# Patient Record
Sex: FEMALE | Race: BLACK OR AFRICAN AMERICAN | NOT HISPANIC OR LATINO | Employment: OTHER | URBAN - METROPOLITAN AREA
[De-identification: names, ages, dates, MRNs, and addresses within clinical notes are randomized per-mention and may not be internally consistent; named-entity substitution may affect disease eponyms.]

---

## 2023-04-17 LAB
6-ACETYLMORPHINE: <25 NG/ML
7-AMINOCLONAZEPAM: <25 NG/ML
ALPHA-HYDROXYALPRAZOLAM: <25 NG/ML
ALPHA-HYDROXYMIDAZOLAM: <25 NG/ML
ALPRAZOLAM: <25 NG/ML
AMPHETAMINE (PRESENCE) IN URINE BY SCREEN METHOD: ABNORMAL
BARBITURATES PRESENCE IN URINE BY SCREEN METHOD: ABNORMAL
CANNABINOIDS IN URINE BY SCREEN METHOD: ABNORMAL
CHLORDIAZEPOXIDE: <25 NG/ML
CLONAZEPAM: <25 NG/ML
COCAINE (PRESENCE) IN URINE BY SCREEN METHOD: ABNORMAL
CODEINE: <50 NG/ML
CREATINE, URINE FOR DRUG: 57.5 MG/DL
DIAZEPAM: <25 NG/ML
DRUG SCREEN COMMENT URINE: ABNORMAL
EDDP: <25 NG/ML
FENTANYL CONFIRMATION, URINE: <2.5 NG/ML
HYDROCODONE: <25 NG/ML
HYDROMORPHONE: <25 NG/ML
LORAZEPAM: <25 NG/ML
METHADONE CONFIRMATION,URINE: <25 NG/ML
MIDAZOLAM: <25 NG/ML
MORPHINE URINE: <50 NG/ML
NORDIAZEPAM: <25 NG/ML
NORFENTANYL: <2.5 NG/ML
NORHYDROCODONE: <25 NG/ML
NOROXYCODONE: >1000 NG/ML
O-DESMETHYLTRAMADOL: <50 NG/ML
OXAZEPAM: <25 NG/ML
OXYCODONE: 1029 NG/ML
OXYMORPHONE: 265 NG/ML
PHENCYCLIDINE (PRESENCE) IN URINE BY SCREEN METHOD: ABNORMAL
TEMAZEPAM: <25 NG/ML
TRAMADOL: <50 NG/ML
ZOLPIDEM METABOLITE (ZCA): <25 NG/ML
ZOLPIDEM: <25 NG/ML

## 2023-10-23 ENCOUNTER — LAB (OUTPATIENT)
Dept: LAB | Facility: LAB | Age: 73
End: 2023-10-23
Payer: COMMERCIAL

## 2023-10-23 DIAGNOSIS — M54.16 RADICULOPATHY, LUMBAR REGION: Primary | ICD-10-CM

## 2023-10-23 DIAGNOSIS — Z79.891 LONG TERM (CURRENT) USE OF OPIATE ANALGESIC: ICD-10-CM

## 2023-10-23 DIAGNOSIS — M54.16 RADICULOPATHY, LUMBAR REGION: ICD-10-CM

## 2023-10-23 PROCEDURE — 80373 DRUG SCREENING TRAMADOL: CPT

## 2023-10-23 PROCEDURE — 82570 ASSAY OF URINE CREATININE: CPT

## 2023-10-23 PROCEDURE — 80365 DRUG SCREENING OXYCODONE: CPT

## 2023-10-23 PROCEDURE — 80358 DRUG SCREENING METHADONE: CPT

## 2023-10-23 PROCEDURE — 80368 SEDATIVE HYPNOTICS: CPT

## 2023-10-23 PROCEDURE — 80354 DRUG SCREENING FENTANYL: CPT

## 2023-10-23 PROCEDURE — 80361 OPIATES 1 OR MORE: CPT

## 2023-10-23 PROCEDURE — 80346 BENZODIAZEPINES1-12: CPT

## 2023-10-23 PROCEDURE — 80307 DRUG TEST PRSMV CHEM ANLYZR: CPT

## 2023-10-24 LAB
AMPHETAMINES UR QL SCN: NORMAL
BARBITURATES UR QL SCN: NORMAL
BZE UR QL SCN: NORMAL
CANNABINOIDS UR QL SCN: NORMAL
CREAT UR-MCNC: 65.6 MG/DL (ref 20–320)
PCP UR QL SCN: NORMAL

## 2023-10-26 LAB
1OH-MIDAZOLAM UR CFM-MCNC: <25 NG/ML
6MAM UR CFM-MCNC: <25 NG/ML
7AMINOCLONAZEPAM UR CFM-MCNC: <25 NG/ML
A-OH ALPRAZ UR CFM-MCNC: <25 NG/ML
ALPRAZ UR CFM-MCNC: <25 NG/ML
CHLORDIAZEP UR CFM-MCNC: <25 NG/ML
CLONAZEPAM UR CFM-MCNC: <25 NG/ML
CODEINE UR CFM-MCNC: <50 NG/ML
DIAZEPAM UR CFM-MCNC: <25 NG/ML
EDDP UR CFM-MCNC: <25 NG/ML
FENTANYL UR CFM-MCNC: <2.5 NG/ML
HYDROCODONE CTO UR CFM-MCNC: <25 NG/ML
HYDROMORPHONE UR CFM-MCNC: <25 NG/ML
LORAZEPAM UR CFM-MCNC: <25 NG/ML
METHADONE UR CFM-MCNC: <25 NG/ML
MIDAZOLAM UR CFM-MCNC: <25 NG/ML
MORPHINE UR CFM-MCNC: <50 NG/ML
NORDIAZEPAM UR CFM-MCNC: <25 NG/ML
NORFENTANYL UR CFM-MCNC: <2.5 NG/ML
NORHYDROCODONE UR CFM-MCNC: <25 NG/ML
NOROXYCODONE UR CFM-MCNC: >1000 NG/ML
NORTRAMADOL UR-MCNC: <50 NG/ML
OXAZEPAM UR CFM-MCNC: <25 NG/ML
OXYCODONE UR CFM-MCNC: 646 NG/ML
OXYMORPHONE UR CFM-MCNC: 202 NG/ML
TEMAZEPAM UR CFM-MCNC: <25 NG/ML
TRAMADOL UR CFM-MCNC: <50 NG/ML
ZOLPIDEM UR CFM-MCNC: <25 NG/ML
ZOLPIDEM UR-MCNC: <25 NG/ML

## 2023-11-20 ENCOUNTER — OFFICE VISIT (OUTPATIENT)
Dept: PAIN MEDICINE | Facility: CLINIC | Age: 73
End: 2023-11-20
Payer: COMMERCIAL

## 2023-11-20 DIAGNOSIS — M51.36 LUMBAR DEGENERATIVE DISC DISEASE: Primary | ICD-10-CM

## 2023-11-20 DIAGNOSIS — Z79.891 LONG TERM CURRENT USE OF OPIATE ANALGESIC: ICD-10-CM

## 2023-11-20 DIAGNOSIS — M06.09 RHEUMATOID ARTHRITIS OF MULTIPLE SITES WITH NEGATIVE RHEUMATOID FACTOR (MULTI): ICD-10-CM

## 2023-11-20 DIAGNOSIS — M54.16 LUMBAR RADICULITIS: ICD-10-CM

## 2023-11-20 PROBLEM — M51.369 LUMBAR DEGENERATIVE DISC DISEASE: Status: ACTIVE | Noted: 2023-11-20

## 2023-11-20 PROCEDURE — 99214 OFFICE O/P EST MOD 30 MIN: CPT | Performed by: PHYSICAL MEDICINE & REHABILITATION

## 2023-11-20 RX ORDER — NALOXONE HYDROCHLORIDE 4 MG/.1ML
4 SPRAY NASAL AS NEEDED
Qty: 2 EACH | Refills: 0 | Status: SHIPPED | OUTPATIENT
Start: 2023-11-20

## 2023-11-20 RX ORDER — DICLOFENAC SODIUM 10 MG/G
4 GEL TOPICAL 4 TIMES DAILY
Qty: 120 G | Refills: 2 | Status: SHIPPED | OUTPATIENT
Start: 2023-11-20 | End: 2024-01-22 | Stop reason: SDUPTHER

## 2023-11-20 RX ORDER — METHOCARBAMOL 500 MG/1
500 TABLET, FILM COATED ORAL 3 TIMES DAILY
Qty: 90 TABLET | Refills: 1 | Status: SHIPPED | OUTPATIENT
Start: 2023-11-20 | End: 2024-01-22 | Stop reason: SDUPTHER

## 2023-11-20 RX ORDER — GABAPENTIN 300 MG/1
300 CAPSULE ORAL 3 TIMES DAILY
Qty: 90 CAPSULE | Refills: 1 | Status: SHIPPED | OUTPATIENT
Start: 2023-11-20 | End: 2024-01-22 | Stop reason: SDUPTHER

## 2023-11-20 RX ORDER — OXYCODONE AND ACETAMINOPHEN 5; 325 MG/1; MG/1
1 TABLET ORAL EVERY 6 HOURS PRN
Qty: 112 TABLET | Refills: 0 | Status: SHIPPED | OUTPATIENT
Start: 2023-12-05 | End: 2024-01-02

## 2023-11-20 RX ORDER — OXYCODONE AND ACETAMINOPHEN 5; 325 MG/1; MG/1
1 TABLET ORAL EVERY 6 HOURS PRN
Qty: 112 TABLET | Refills: 0 | Status: SHIPPED | OUTPATIENT
Start: 2024-01-02 | End: 2024-01-22 | Stop reason: SDUPTHER

## 2023-11-20 NOTE — PROGRESS NOTES
Chief complaint  Back and lower limbs pain  Multiple joints pain    History  Ms Lara is back for visit  She continues to have the pain in the joints she see rheumatology  The pain in the back is deep achy worse in the mid back area.  This is associated with tight muscle bands.  This limits the range of motion of the lumbar spine mainly in forward flexion.  The pain in the back is radiating around the side on the lateral aspect of the   thighs going down toward the lateral aspect of the legs into the lateral malleosli toward the lateral aspect of the feet towards the big toes.    This pain down to the lower limbs is more of a burning tingling sensation.  The pain in the   lower limbs worsens with bending forward or with any lifting, it improves with laying on the side and resting.  This is similar to the associated pain in the middle to lower back.  Denied any bowel or bladder incontinence.  With the worst pain there is tendency to catch the toe especially on carpeted area.  This occurs mostly when tired and toward the end of the day.       Pain level without medication is 7/10 , with the medication pain level 0/10.     The pain meds are helping control the pain and improving Activities of Daily living and quality of life and quality of sleep.    opioids treatment agreement 2023  Oarrs pulled and scanned in the chart  no concerns  last urine toxicology testing earlier this year and it was compliant we will repeat  Xray updated spine   ORT Score is  0  Pain pathology and pain generators spine and joints   Modalities tried injection, surgery, physical therapy, TENS unit, nonsteroidal anti-inflammatory medication       Denied any fever or chills. No weight loss and no night sweats. No cough or sputum production. No diarrhea   The constipation has been responding to fibers and over the counter medications.     No bladder and bowel incontinence and no other changes in bladder and bowel. No skin changes.  Reports  tiredness and fatigability only if the pain is not controlled.   Denied opioids diversion and abuse and denies alcoholism. Denies overuse of the pain medications.    The control of the pain with the pain medications is helping the control of the symptoms and allowing the function and activities of daily living, enjoyment of life, improving the quality of life and sleep with less interruption by the pain. The goal is symptomatic control of the nonmalignant chronic pain and not to repair the permanent damage in the tissues inducing the chronic pain conditions. We are aiming to shift the focus from the nonmalignant chronic pain to other aspects of life by symptomatically treating this chronic pain. If this pain is not treated it will lead to major morbidity and it is also associated with increased risks of mortality. The patient understands those very clearly and also understand high risks of morbidity and mortality if not strictly adherent to the treatment recommendations and reporting any associated side effects. Also patient understand the full responsibility associated with these medications to avoid abuse or overuse or any use of these medications for anything besides treating the patient's own chronic pain and nothing else under any circumstances.        Physical examination  Awake, alert and oriented for time place and persons   declined Chaperone for the visit and was adequately  draped for the exam.      There is decreased sensory to light touch on the lateral aspects of the thighs and around the   knee going down to the lateral aspect of the legs to the   lateral malleoli into the dorsum of the feet..    Deep tendon reflexes is present for the patellar tendons bilaterally.  Achilles reflexes are present bilaterally and symmetric.    Medial Hamstrings reflex is decreased bilaterally  Plantar cutaneous are downgoing.  Ankle dorsiflexion is 5/5 bilaterally.  Plantar flexion of the ankles are 5/5 bilaterally.   Big toe extension is 4/5 bilaterally  Negative Tinel's sign over the right peroneal nerve at the fibular neck.  Gracie sign for axial loading and global rotation are negative.  No aberrant pain behavior.   She has swelling at the joints     Diagnosis  Problem List Items Addressed This Visit       Lumbar degenerative disc disease - Primary    Lumbar radiculitis    Rheumatoid arthritis of multiple sites with negative rheumatoid factor (CMS/HCC)    Long term current use of opiate analgesic    Relevant Orders    Opiate/Opioid/Benzo Prescription Compliance        Plan  Reviewed the pain generators.  Went over the types of pain with neuropathic and nociceptive and different pathologies and therapeutic modalities. Discussed the mechanism of action of interventions from acupuncture, physical therapy , regular exercises, injections, botox, spinal cord stimulation, and role of surgery     Went over pathology of the intervertebral disc displacement and the anatomical relation to the Nerve roots and relation to the radicular symptoms. Went over treatment modalities with conservative treatment including acupuncture   and epidural steroid injection with fluoroscopy guidance and last resort of surgery    Based on the above findings and the clinical response to the opioids medications and improvement of the activities of daily living, sleep, and work performance. We made this complex decision to continue the opioids therapy in light of the evidence of the patient's responsibility in using the pain medications as prescribed for the nonmalignant chronic pain condition. We discussed about the use of the pain medications to treat the symptoms of chronic nonmalignant pain and we are not trying the repair the permanent damage in the tissues, rather we are trying to control the symptoms induced by the permanent damage to the tissues inducing the chronic pain condition and resulting disability. I explained the difference and discussed it  with the patient and stressed the importance of knowing the difference especially because of the potential side effects and the potential addicting effect and habit forming nature of the dangerous drugs we are using to treat the symptoms of the chronic pain.      We discussed that we are prescribing the medications on good qi and legitimate medical reason.     We reviewed the side effects and precautions of opioids prescriptions as discussed in the opioids treatment agreement.    realizes the interaction between the therapeutic classes including the respiratory depression and potential death     Random drug testing twice in 6 months we will submit     Oxycodone max of 4 a day  Robaxin and gabapentin . Movantik for constipation    Discussed about NSAIDS and I explained about the opioids sparing effect to allow keeping the opioids dose at minimal effective dose.   I went over the potential side effects of the NSAIDS on the gastrointestinal, renal and cardiovascular systems.      I detailed the side effects from the acetaminophen in the medication and made aware of those. I also explained about the cumulative effects on the organs and mainly the liver.     Given the opioids therapy , we discussed about the risk for accidental over dose on the pain medications, either for patient or other household. I went over the mechanism of action and mode of use of the Naloxone according to the  recommendations. I will provide a prescription for a kit.     Follow-up 8 weeks or earlier if needed     The level of clinical decision making in this office visit,  is high, given the high risks of complications with the morbidity and mortality due to the fact that acute and chronic pain may pose a threat to life and bodily function, if under treated, poorly treated, or with failure to maintain adequate treatment and timely medical follow up. Additionally over treatment has its own set of complications including overdosing  on the pain medications and also the habit forming potentials with the use of the medications used to treat chronic painful conditions including therapeutic classes classified as dangerous medications. Given the serious and fluctuating nature of pain level and instensity with extensive consideration for whenever pain changes, there is always the risk of prolonged functional impairment requiring close patient monitoring with regular assessments and reassessments and high level medical decision making at every office visit. The amount and complexity of data reviewed is high given the patient clinical presentation, labs,  data, radiology reports, and other tests as discussed during office visits. Pertinent data whether positive or negative were taken in consideration in the process of making this high level medical decision.

## 2024-01-22 ENCOUNTER — OFFICE VISIT (OUTPATIENT)
Dept: PAIN MEDICINE | Facility: CLINIC | Age: 74
End: 2024-01-22
Payer: COMMERCIAL

## 2024-01-22 DIAGNOSIS — M51.36 LUMBAR DEGENERATIVE DISC DISEASE: ICD-10-CM

## 2024-01-22 DIAGNOSIS — Z79.891 LONG TERM CURRENT USE OF OPIATE ANALGESIC: ICD-10-CM

## 2024-01-22 DIAGNOSIS — M06.09 RHEUMATOID ARTHRITIS OF MULTIPLE SITES WITH NEGATIVE RHEUMATOID FACTOR (MULTI): Primary | ICD-10-CM

## 2024-01-22 DIAGNOSIS — M54.16 LUMBAR RADICULITIS: ICD-10-CM

## 2024-01-22 PROCEDURE — 99214 OFFICE O/P EST MOD 30 MIN: CPT | Performed by: PHYSICAL MEDICINE & REHABILITATION

## 2024-01-22 RX ORDER — METHOCARBAMOL 500 MG/1
500 TABLET, FILM COATED ORAL 3 TIMES DAILY
Qty: 90 TABLET | Refills: 1 | Status: SHIPPED | OUTPATIENT
Start: 2024-01-22 | End: 2024-03-25 | Stop reason: SDUPTHER

## 2024-01-22 RX ORDER — DICLOFENAC SODIUM 10 MG/G
4 GEL TOPICAL 4 TIMES DAILY
Qty: 120 G | Refills: 2 | Status: SHIPPED | OUTPATIENT
Start: 2024-01-22 | End: 2024-03-25 | Stop reason: SDUPTHER

## 2024-01-22 RX ORDER — GABAPENTIN 300 MG/1
300 CAPSULE ORAL 3 TIMES DAILY
Qty: 90 CAPSULE | Refills: 1 | Status: SHIPPED | OUTPATIENT
Start: 2024-02-01 | End: 2024-03-25 | Stop reason: SDUPTHER

## 2024-01-22 RX ORDER — OXYCODONE AND ACETAMINOPHEN 5; 325 MG/1; MG/1
1 TABLET ORAL EVERY 6 HOURS PRN
Qty: 112 TABLET | Refills: 0 | Status: SHIPPED | OUTPATIENT
Start: 2024-02-01 | End: 2024-02-29

## 2024-01-22 RX ORDER — OXYCODONE AND ACETAMINOPHEN 5; 325 MG/1; MG/1
1 TABLET ORAL EVERY 6 HOURS PRN
Qty: 112 TABLET | Refills: 0 | Status: SHIPPED | OUTPATIENT
Start: 2024-02-29 | End: 2024-03-25 | Stop reason: SDUPTHER

## 2024-01-22 NOTE — PROGRESS NOTES
Chief complaint  Back and lower limbs   Joints pain    History  Ms Lara is back for visit  She has back and lower limbs compatible with the degenerative disc disease and lumbar radic  She did not want injeciton at this time  In the past she had those elsewhere and did not last    On the other hand she has the RA and she sees rheum . She is on Embrel sthat is working for her and after that she could cut pain meds from 180 MED to 30 MED currently  Continue with oxycodnoe 5 mg QID  She is aware of controversy of the pain meds for non cancer pain       Pain level without medication is 8/10 , with the medication pain level 4/10.     The pain meds are helping control the pain and improving Activities of Daily living and quality of life and quality of sleep.    opioids treatment agreement Jan 2024  PDI (Pain Disability Index) score: 49  Oarrs pulled and scanned in the chart  no concerns  last urine toxicology testing earlier this year and it was compliant we will repeat  Xray updated spine   ORT Score is  0  Pain pathology and pain generators spine and RA  Modalities tried injection, surgery, physical therapy, TENS unit, nonsteroidal anti-inflammatory medication       Denied any fever or chills. No weight loss and no night sweats. No cough or sputum production. No diarrhea   The constipation has been responding to fibers and over the counter medications.     No bladder and bowel incontinence and no other changes in bladder and bowel. No skin changes.  Reports tiredness and fatigability only if the pain is not controlled.   Denied opioids diversion and abuse and denies alcoholism. Denies overuse of the pain medications.    The control of the pain with the pain medications is helping the control of the symptoms and allowing the function and activities of daily living, enjoyment of life, improving the quality of life and sleep with less interruption by the pain. The goal is symptomatic control of the nonmalignant chronic  pain and not to repair the permanent damage in the tissues inducing the chronic pain conditions. We are aiming to shift the focus from the nonmalignant chronic pain to other aspects of life by symptomatically treating this chronic pain. If this pain is not treated it will lead to major morbidity and it is also associated with increased risks of mortality. The patient understands those very clearly and also understand high risks of morbidity and mortality if not strictly adherent to the treatment recommendations and reporting any associated side effects. Also patient understand the full responsibility associated with these medications to avoid abuse or overuse or any use of these medications for anything besides treating the patient's own chronic pain and nothing else under any circumstances.        Physical examination  Awake, alert and oriented for time place and persons   declined Chaperone for the visit and was adequately  draped for the exam.      There is decreased sensory to light touch on the lateral aspects of the thighs and around the   knee going down to the lateral aspect of the legs to the   lateral malleoli into the dorsum of the feet..    Deep tendon reflexes is present for the patellar tendons bilaterally.  Achilles reflexes are present bilaterally and symmetric.    Medial Hamstrings reflex is decreased bilaterally  Plantar cutaneous are downgoing.  Ankle dorsiflexion is 5/5 bilaterally.  Plantar flexion of the ankles are 5/5 bilaterally.  Big toe extension is 4/5 bilaterally  Negative Tinel's sign over the right peroneal nerve at the fibular neck.  Gracie sign for axial loading and global rotation are negative.  No aberrant pain behavior.     Swollen joint in knee some pain     Diagnosis  Problem List Items Addressed This Visit       Lumbar degenerative disc disease    Relevant Medications    gabapentin (Neurontin) 300 mg capsule (Start on 2/1/2024)    naloxegol oxalate (Movantik) 25 mg     oxyCODONE-acetaminophen (Percocet) 5-325 mg tablet (Start on 2/1/2024)    oxyCODONE-acetaminophen (Percocet) 5-325 mg tablet (Start on 2/29/2024)    methocarbamol (Robaxin) 500 mg tablet    diclofenac sodium (Voltaren) 1 % gel gel    Lumbar radiculitis    Relevant Medications    gabapentin (Neurontin) 300 mg capsule (Start on 2/1/2024)    naloxegol oxalate (Movantik) 25 mg    oxyCODONE-acetaminophen (Percocet) 5-325 mg tablet (Start on 2/1/2024)    oxyCODONE-acetaminophen (Percocet) 5-325 mg tablet (Start on 2/29/2024)    methocarbamol (Robaxin) 500 mg tablet    diclofenac sodium (Voltaren) 1 % gel gel    Rheumatoid arthritis of multiple sites with negative rheumatoid factor (CMS/HCC) - Primary    Relevant Medications    gabapentin (Neurontin) 300 mg capsule (Start on 2/1/2024)    naloxegol oxalate (Movantik) 25 mg    oxyCODONE-acetaminophen (Percocet) 5-325 mg tablet (Start on 2/1/2024)    oxyCODONE-acetaminophen (Percocet) 5-325 mg tablet (Start on 2/29/2024)    methocarbamol (Robaxin) 500 mg tablet    diclofenac sodium (Voltaren) 1 % gel gel    Long term current use of opiate analgesic    Relevant Medications    gabapentin (Neurontin) 300 mg capsule (Start on 2/1/2024)    naloxegol oxalate (Movantik) 25 mg    methocarbamol (Robaxin) 500 mg tablet    diclofenac sodium (Voltaren) 1 % gel gel        Plan  Reviewed the pain generators.  Went over the types of pain with neuropathic and nociceptive and different pathologies and therapeutic modalities. Discussed the mechanism of action of interventions from acupuncture, physical therapy , regular exercises, injections, botox, spinal cord stimulation, and role of surgery     Went over pathology of the intervertebral disc displacement and the anatomical relation to the Nerve roots and relation to the radicular symptoms. Went over treatment modalities with conservative treatment including acupuncture   and epidural steroid injection with fluoroscopy guidance and last  resort of surgery    Based on the above findings and the clinical response to the opioids medications and improvement of the activities of daily living, sleep, and work performance. We made this complex decision to continue the opioids therapy in light of the evidence of the patient's responsibility in using the pain medications as prescribed for the nonmalignant chronic pain condition. We discussed about the use of the pain medications to treat the symptoms of chronic nonmalignant pain and we are not trying the repair the permanent damage in the tissues, rather we are trying to control the symptoms induced by the permanent damage to the tissues inducing the chronic pain condition and resulting disability. I explained the difference and discussed it with the patient and stressed the importance of knowing the difference especially because of the potential side effects and the potential addicting effect and habit forming nature of the dangerous drugs we are using to treat the symptoms of the chronic pain.      We discussed that we are prescribing the medications on good qi and legitimate medical reason.     We reviewed the side effects and precautions of opioids prescriptions as discussed in the opioids treatment agreement.    realizes the interaction between the therapeutic classes including the respiratory depression and potential death     Random drug testing twice in 6 months we will submit     Continue with oxycodone has a narcan at home know how and when to use it if needed.   Continue with realizes the interaction between the therapeutic classes including the respiratory depression and potential death , and Movantic for consitation   Perlita for burning pain   Continue with Home exercises program and ADL    Discussed about NSAIDS and I explained about the opioids sparing effect to allow keeping the opioids dose at minimal effective dose.   I went over the potential side effects of the NSAIDS on the  gastrointestinal, renal and cardiovascular systems.      I detailed the side effects from the acetaminophen in the medication and made aware of those. I also explained about the cumulative effects on the organs and mainly the liver.     Given the opioids therapy , we discussed about the risk for accidental over dose on the pain medications, either for patient or other household. I went over the mechanism of action and mode of use of the Naloxone according to the  recommendations. I will provide a prescription for a kit.     Follow-up 8 weeks or earlier if needed     The level of clinical decision making in this office visit,  is high, given the high risks of complications with the morbidity and mortality due to the fact that acute and chronic pain may pose a threat to life and bodily function, if under treated, poorly treated, or with failure to maintain adequate treatment and timely medical follow up. Additionally over treatment has its own set of complications including overdosing on the pain medications and also the habit forming potentials with the use of the medications used to treat chronic painful conditions including therapeutic classes classified as dangerous medications. Given the serious and fluctuating nature of pain level and instensity with extensive consideration for whenever pain changes, there is always the risk of prolonged functional impairment requiring close patient monitoring with regular assessments and reassessments and high level medical decision making at every office visit. The amount and complexity of data reviewed is high given the patient clinical presentation, labs,  data, radiology reports, and other tests as discussed during office visits. Pertinent data whether positive or negative were taken in consideration in the process of making this high level medical decision.

## 2024-03-25 ENCOUNTER — OFFICE VISIT (OUTPATIENT)
Dept: PAIN MEDICINE | Facility: CLINIC | Age: 74
End: 2024-03-25
Payer: COMMERCIAL

## 2024-03-25 ENCOUNTER — LAB (OUTPATIENT)
Dept: LAB | Facility: LAB | Age: 74
End: 2024-03-25
Payer: COMMERCIAL

## 2024-03-25 DIAGNOSIS — M51.36 LUMBAR DEGENERATIVE DISC DISEASE: ICD-10-CM

## 2024-03-25 DIAGNOSIS — M06.09 RHEUMATOID ARTHRITIS OF MULTIPLE SITES WITH NEGATIVE RHEUMATOID FACTOR (MULTI): ICD-10-CM

## 2024-03-25 DIAGNOSIS — Z79.891 LONG TERM CURRENT USE OF OPIATE ANALGESIC: ICD-10-CM

## 2024-03-25 DIAGNOSIS — M54.16 LUMBAR RADICULITIS: ICD-10-CM

## 2024-03-25 DIAGNOSIS — Z79.891 LONG TERM CURRENT USE OF OPIATE ANALGESIC: Primary | ICD-10-CM

## 2024-03-25 PROCEDURE — 82570 ASSAY OF URINE CREATININE: CPT

## 2024-03-25 PROCEDURE — 80368 SEDATIVE HYPNOTICS: CPT

## 2024-03-25 PROCEDURE — 80346 BENZODIAZEPINES1-12: CPT

## 2024-03-25 PROCEDURE — 80358 DRUG SCREENING METHADONE: CPT

## 2024-03-25 PROCEDURE — 80307 DRUG TEST PRSMV CHEM ANLYZR: CPT

## 2024-03-25 PROCEDURE — 80365 DRUG SCREENING OXYCODONE: CPT

## 2024-03-25 PROCEDURE — 99214 OFFICE O/P EST MOD 30 MIN: CPT | Performed by: PHYSICAL MEDICINE & REHABILITATION

## 2024-03-25 PROCEDURE — 80373 DRUG SCREENING TRAMADOL: CPT

## 2024-03-25 PROCEDURE — 80361 OPIATES 1 OR MORE: CPT

## 2024-03-25 PROCEDURE — 80354 DRUG SCREENING FENTANYL: CPT

## 2024-03-25 RX ORDER — GABAPENTIN 300 MG/1
300 CAPSULE ORAL 3 TIMES DAILY
Qty: 90 CAPSULE | Refills: 1 | Status: SHIPPED | OUTPATIENT
Start: 2024-03-25 | End: 2024-05-21 | Stop reason: SDUPTHER

## 2024-03-25 RX ORDER — METHOCARBAMOL 500 MG/1
500 TABLET, FILM COATED ORAL 3 TIMES DAILY
Qty: 90 TABLET | Refills: 1 | Status: SHIPPED | OUTPATIENT
Start: 2024-03-25 | End: 2024-04-24

## 2024-03-25 RX ORDER — OXYCODONE AND ACETAMINOPHEN 5; 325 MG/1; MG/1
1 TABLET ORAL EVERY 6 HOURS PRN
Qty: 112 TABLET | Refills: 0 | Status: SHIPPED | OUTPATIENT
Start: 2024-04-28 | End: 2024-05-21 | Stop reason: SDUPTHER

## 2024-03-25 RX ORDER — DICLOFENAC SODIUM 10 MG/G
4 GEL TOPICAL 4 TIMES DAILY
Qty: 120 G | Refills: 2 | Status: SHIPPED | OUTPATIENT
Start: 2024-03-25 | End: 2024-05-21 | Stop reason: SDUPTHER

## 2024-03-25 RX ORDER — OXYCODONE AND ACETAMINOPHEN 5; 325 MG/1; MG/1
1 TABLET ORAL EVERY 6 HOURS PRN
Qty: 112 TABLET | Refills: 0 | Status: SHIPPED | OUTPATIENT
Start: 2024-03-31 | End: 2024-04-28

## 2024-03-25 NOTE — PROGRESS NOTES
Chief complaint  Back and lower limbs pain    History  Phyllis Lara is back for pain management office visit  Continue with pain in the back and R leg  That is sciatica she did not want LUAN neither SCS she had information about those  The pain in the back is deep achy worse in the mid back area.  This is associated with tight muscle bands.  This limits the range of motion of the lumbar spine mainly in forward flexion.  The pain in the back is radiating around the side on the lateral aspect of the right thigh going down toward the lateral aspect of the right leg into the lateral malleolus toward the lateral aspect of the foot towards the big toe.    This pain down to the right lower limb is more of a burning tingling sensation.  The pain in the right lower limb worsens with bending forward or with any lifting, it improves with laying on the side and resting.  This is similar to the associated pain in the middle to lower back.  Denied any bowel or bladder incontinence.  With the worst pain there is tendency to catch the toe especially on carpeted area.  This occurs mostly when tired and toward the end of the day.    The skin is intact with no breakdown.  No vesicles.    Also having pain in joints related to RA and she see  rheum for that     Pain level without medication is 8/10 , with the medication pain level 3/10.     The pain meds are helping control the pain and improving Activities of Daily living and quality of life and quality of sleep.    opioids treatment agreement Jan 2024  PDI (Pain Disability Index) score: 44  Oarrs pulled and scanned in the chart  no concerns  last urine toxicology testing earlier this year and it was compliant we will repeat  Xray updated spine and joints   ORT Score is  0  Pain pathology and pain generators spine   Modalities tried injection, surgery, physical therapy, TENS unit, nonsteroidal anti-inflammatory medication       Denied any fever or chills. No weight loss and no night  sweats. No cough or sputum production. No diarrhea   The constipation has been responding to fibers and over the counter medications.     No bladder and bowel incontinence and no other changes in bladder and bowel. No skin changes.  Reports tiredness and fatigability only if the pain is not controlled.   Denied opioids diversion and abuse and denies alcoholism. Denies overuse of the pain medications.    The control of the pain with the pain medications is helping the control of the symptoms and allowing the function and activities of daily living, enjoyment of life, improving the quality of life and sleep with less interruption by the pain. The goal is symptomatic control of the nonmalignant chronic pain and not to repair the permanent damage in the tissues inducing the chronic pain conditions. We are aiming to shift the focus from the nonmalignant chronic pain to other aspects of life by symptomatically treating this chronic pain. If this pain is not treated it will lead to major morbidity and it is also associated with increased risks of mortality. The patient understands those very clearly and also understand high risks of morbidity and mortality if not strictly adherent to the treatment recommendations and reporting any associated side effects. Also patient understand the full responsibility associated with these medications to avoid abuse or overuse or any use of these medications for anything besides treating the patient's own chronic pain and nothing else under any circumstances.        Physical examination  Awake, alert and oriented for time place and persons   declined Chaperone for the visit and was adequately  draped for the exam.      Examination of the lumbar spine showed tight muscle bands in the mid and lower back area, this is more pronounced on the right compared to the left.  Additionally, this is inducing mild reversal of the lumbar lordosis with functional scoliosis with right-sided concavity.   This scoliosis corrected with  bending of the lumbar spine.     Straight leg raising increased the pain in the back and down the lateral aspect of the right knee onto the lateral leg to the lateral malleolus and foot.     There is decreased sensory to light touch on the lateral aspect of the thigh and around the right knee going down to the lateral aspect of the leg to the right lateral malleolus into the dorsum of the foot..    Deep tendon reflexes is present for the patellar tendons bilaterally.  Achilles reflexes are present bilaterally and symmetric.    Medial Hamstrings reflex is decreased on the right compared to the left side.  Plantar cutaneous are downgoing.  Ankle dorsiflexion is 5/5 bilaterally.  Plantar flexion of the ankles are 5/5 bilaterally.  Big toe extension is 4/5 on the right compared to 5/5 on the left side.    Negative Tinel's sign over the right peroneal nerve at the fibular neck.  Gracie sign for axial loading and global rotation are negative.  No aberrant pain behavior.           Diagnosis  Problem List Items Addressed This Visit       Lumbar degenerative disc disease    Relevant Medications    oxyCODONE-acetaminophen (Percocet) 5-325 mg tablet (Start on 3/31/2024)    oxyCODONE-acetaminophen (Percocet) 5-325 mg tablet (Start on 4/28/2024)    gabapentin (Neurontin) 300 mg capsule    methocarbamol (Robaxin) 500 mg tablet    diclofenac sodium (Voltaren) 1 % gel    naloxegol oxalate (Movantik) 25 mg    Lumbar radiculitis    Relevant Medications    oxyCODONE-acetaminophen (Percocet) 5-325 mg tablet (Start on 3/31/2024)    oxyCODONE-acetaminophen (Percocet) 5-325 mg tablet (Start on 4/28/2024)    gabapentin (Neurontin) 300 mg capsule    methocarbamol (Robaxin) 500 mg tablet    diclofenac sodium (Voltaren) 1 % gel    naloxegol oxalate (Movantik) 25 mg    Rheumatoid arthritis of multiple sites with negative rheumatoid factor (CMS/HCC)    Relevant Medications    oxyCODONE-acetaminophen (Percocet)  5-325 mg tablet (Start on 3/31/2024)    oxyCODONE-acetaminophen (Percocet) 5-325 mg tablet (Start on 4/28/2024)    gabapentin (Neurontin) 300 mg capsule    methocarbamol (Robaxin) 500 mg tablet    diclofenac sodium (Voltaren) 1 % gel    naloxegol oxalate (Movantik) 25 mg    Long term current use of opiate analgesic - Primary    Relevant Medications    gabapentin (Neurontin) 300 mg capsule    methocarbamol (Robaxin) 500 mg tablet    diclofenac sodium (Voltaren) 1 % gel    naloxegol oxalate (Movantik) 25 mg    Other Relevant Orders    Opiate/Opioid/Benzo Prescription Compliance        Plan  Reviewed the pain generators.  Went over the types of pain with neuropathic and nociceptive and different pathologies and therapeutic modalities. Discussed the mechanism of action of interventions from acupuncture, physical therapy , regular exercises, injections, botox, spinal cord stimulation, and role of surgery     Went over pathology of the intervertebral disc displacement and the anatomical relation to the Nerve roots and relation to the radicular symptoms. Went over treatment modalities with conservative treatment including acupuncture   and epidural steroid injection with fluoroscopy guidance and last resort of surgery    Based on the above findings and the clinical response to the opioids medications and improvement of the activities of daily living, sleep, and work performance. We made this complex decision to continue the opioids therapy in light of the evidence of the patient's responsibility in using the pain medications as prescribed for the nonmalignant chronic pain condition. We discussed about the use of the pain medications to treat the symptoms of chronic nonmalignant pain and we are not trying the repair the permanent damage in the tissues, rather we are trying to control the symptoms induced by the permanent damage to the tissues inducing the chronic pain condition and resulting disability. I explained the  difference and discussed it with the patient and stressed the importance of knowing the difference especially because of the potential side effects and the potential addicting effect and habit forming nature of the dangerous drugs we are using to treat the symptoms of the chronic pain.      We discussed that we are prescribing the medications on good qi and legitimate medical reason.     We reviewed the side effects and precautions of opioids prescriptions as discussed in the opioids treatment agreement.    realizes the interaction between the therapeutic classes including the respiratory depression and potential death     Random drug testing twice in 6 months we will submit     Consider cut back oxycodone and consider SCS   has a narcan at home know how and when to use it if needed.   Oxycodone 5 qid   Gabapentin for burning pain   Robaxin for muscle relaxers   Diclofenac gel 1% topically  Continue with Home exercises program   Movantik for OIC.     Discussed about NSAIDS and I explained about the opioids sparing effect to allow keeping the opioids dose at minimal effective dose.   I went over the potential side effects of the NSAIDS on the gastrointestinal, renal and cardiovascular systems.      I detailed the side effects from the acetaminophen in the medication and made aware of those. I also explained about the cumulative effects on the organs and mainly the liver.     Given the opioids therapy , we discussed about the risk for accidental over dose on the pain medications, either for patient or other household. I went over the mechanism of action and mode of use of the Naloxone according to the  recommendations. I will provide a prescription for a kit.     Follow-up 8 weeks or earlier if needed     The level of clinical decision making in this office visit,  is high, given the high risks of complications with the morbidity and mortality due to the fact that acute and chronic pain may pose a  threat to life and bodily function, if under treated, poorly treated, or with failure to maintain adequate treatment and timely medical follow up. Additionally over treatment has its own set of complications including overdosing on the pain medications and also the habit forming potentials with the use of the medications used to treat chronic painful conditions including therapeutic classes classified as dangerous medications. Given the serious and fluctuating nature of pain level and instensity with extensive consideration for whenever pain changes, there is always the risk of prolonged functional impairment requiring close patient monitoring with regular assessments and reassessments and high level medical decision making at every office visit. The amount and complexity of data reviewed is high given the patient clinical presentation, labs,  data, radiology reports, and other tests as discussed during office visits. Pertinent data whether positive or negative were taken in consideration in the process of making this high level medical decision.

## 2024-03-26 LAB
AMPHETAMINES UR QL SCN: NORMAL
BARBITURATES UR QL SCN: NORMAL
BZE UR QL SCN: NORMAL
CANNABINOIDS UR QL SCN: NORMAL
CREAT UR-MCNC: 65.5 MG/DL (ref 20–320)
PCP UR QL SCN: NORMAL

## 2024-03-29 LAB
1OH-MIDAZOLAM UR CFM-MCNC: <25 NG/ML
6MAM UR CFM-MCNC: <25 NG/ML
7AMINOCLONAZEPAM UR CFM-MCNC: <25 NG/ML
A-OH ALPRAZ UR CFM-MCNC: <25 NG/ML
ALPRAZ UR CFM-MCNC: <25 NG/ML
CHLORDIAZEP UR CFM-MCNC: <25 NG/ML
CLONAZEPAM UR CFM-MCNC: <25 NG/ML
CODEINE UR CFM-MCNC: <50 NG/ML
DIAZEPAM UR CFM-MCNC: <25 NG/ML
EDDP UR CFM-MCNC: <25 NG/ML
FENTANYL UR CFM-MCNC: <2.5 NG/ML
HYDROCODONE CTO UR CFM-MCNC: <25 NG/ML
HYDROMORPHONE UR CFM-MCNC: <25 NG/ML
LORAZEPAM UR CFM-MCNC: <25 NG/ML
METHADONE UR CFM-MCNC: <25 NG/ML
MIDAZOLAM UR CFM-MCNC: <25 NG/ML
MORPHINE UR CFM-MCNC: <50 NG/ML
NORDIAZEPAM UR CFM-MCNC: <25 NG/ML
NORFENTANYL UR CFM-MCNC: <2.5 NG/ML
NORHYDROCODONE UR CFM-MCNC: <25 NG/ML
NOROXYCODONE UR CFM-MCNC: >1000 NG/ML
NORTRAMADOL UR-MCNC: <50 NG/ML
OXAZEPAM UR CFM-MCNC: <25 NG/ML
OXYCODONE UR CFM-MCNC: 792 NG/ML
OXYMORPHONE UR CFM-MCNC: 476 NG/ML
TEMAZEPAM UR CFM-MCNC: <25 NG/ML
TRAMADOL UR CFM-MCNC: <50 NG/ML
ZOLPIDEM UR CFM-MCNC: <25 NG/ML
ZOLPIDEM UR-MCNC: <25 NG/ML

## 2024-05-21 ENCOUNTER — OFFICE VISIT (OUTPATIENT)
Dept: PAIN MEDICINE | Facility: CLINIC | Age: 74
End: 2024-05-21
Payer: COMMERCIAL

## 2024-05-21 DIAGNOSIS — Z79.891 LONG TERM CURRENT USE OF OPIATE ANALGESIC: ICD-10-CM

## 2024-05-21 DIAGNOSIS — M51.36 LUMBAR DEGENERATIVE DISC DISEASE: Primary | ICD-10-CM

## 2024-05-21 DIAGNOSIS — M54.16 LUMBAR RADICULITIS: ICD-10-CM

## 2024-05-21 DIAGNOSIS — M06.09 RHEUMATOID ARTHRITIS OF MULTIPLE SITES WITH NEGATIVE RHEUMATOID FACTOR (MULTI): ICD-10-CM

## 2024-05-21 PROCEDURE — 99214 OFFICE O/P EST MOD 30 MIN: CPT | Performed by: PHYSICAL MEDICINE & REHABILITATION

## 2024-05-21 RX ORDER — GABAPENTIN 300 MG/1
300 CAPSULE ORAL 3 TIMES DAILY
Qty: 90 CAPSULE | Refills: 1 | Status: SHIPPED | OUTPATIENT
Start: 2024-05-21 | End: 2024-06-20

## 2024-05-21 RX ORDER — DICLOFENAC SODIUM 10 MG/G
4 GEL TOPICAL 4 TIMES DAILY
Qty: 120 G | Refills: 2 | Status: SHIPPED | OUTPATIENT
Start: 2024-05-21

## 2024-05-21 RX ORDER — OXYCODONE AND ACETAMINOPHEN 5; 325 MG/1; MG/1
1 TABLET ORAL EVERY 6 HOURS PRN
Qty: 112 TABLET | Refills: 0 | Status: SHIPPED | OUTPATIENT
Start: 2024-05-27 | End: 2024-06-24

## 2024-05-21 RX ORDER — OXYCODONE AND ACETAMINOPHEN 5; 325 MG/1; MG/1
1 TABLET ORAL EVERY 6 HOURS PRN
Qty: 112 TABLET | Refills: 0 | Status: SHIPPED | OUTPATIENT
Start: 2024-06-24 | End: 2024-07-22

## 2024-05-21 NOTE — PROGRESS NOTES
Chief complaint  Back and legs pain   Joints pian from RA    History  Phyllis Lara is back for pain management office visit  Continue with pain in the back and joints   Worst is back pain. She is seeing Rheum too  The pain in the back is deep achy worse in the mid back area.  This is associated with tight muscle bands.  This limits the range of motion of the lumbar spine mainly in forward flexion.  The pain in the back is radiating around the side on the lateral aspect of the   thighs going down toward the lateral aspect of the legs into the lateral malleosli toward the lateral aspect of the feet towards the big toes.    This pain down to the lower limbs is more of a burning tingling sensation.  The pain in the   lower limbs worsens with bending forward or with any lifting, it improves with laying on the side and resting.  This is similar to the associated pain in the middle to lower back.  Denied any bowel or bladder incontinence.  With the worst pain there is tendency to catch the toe especially on carpeted area.  This occurs mostly when tired and toward the end of the day.    Long discussion about putting effort in cutting back on pain medications. Discussed about pain level changing and we do not know if the medications at this amount are still needed until we try and cut back slowly on pain medications. If the cut is tolerated then we continue. If cut not tolerated then, will go back on the pain medications level.  The goal from this is to keep the pain medications at the lowest effective dose.           Pain level without medication is 8/10 , with the medication pain level 0/10.     The pain meds are helping control the pain and improving Activities of Daily living and quality of life and quality of sleep.    opioids treatment agreement Jan 2024  Pill count today, using count tray, and in front of patient :  23    pills , last fill was on 4/29  for 112 tabs,  the count is correct  Oarrs pulled and scanned in  the chart  no concerns  last urine toxicology testing earlier this year and it was compliant we will repeat  Xray updated spine and joints   ORT Score is  0  Pain pathology and pain generators spine   Modalities tried injection, surgery, physical therapy, TENS unit, nonsteroidal anti-inflammatory medication       Denied any fever or chills. No weight loss and no night sweats. No cough or sputum production. No diarrhea   The constipation has been responding to fibers and over the counter medications.     No bladder and bowel incontinence and no other changes in bladder and bowel. No skin changes.  Reports tiredness and fatigability only if the pain is not controlled.   Denied opioids diversion and abuse and denies alcoholism. Denies overuse of the pain medications.    The control of the pain with the pain medications is helping the control of the symptoms and allowing the function and activities of daily living, enjoyment of life, improving the quality of life and sleep with less interruption by the pain. The goal is symptomatic control of the nonmalignant chronic pain and not to repair the permanent damage in the tissues inducing the chronic pain conditions. We are aiming to shift the focus from the nonmalignant chronic pain to other aspects of life by symptomatically treating this chronic pain. If this pain is not treated it will lead to major morbidity and it is also associated with increased risks of mortality. The patient understands those very clearly and also understand high risks of morbidity and mortality if not strictly adherent to the treatment recommendations and reporting any associated side effects. Also patient understand the full responsibility associated with these medications to avoid abuse or overuse or any use of these medications for anything besides treating the patient's own chronic pain and nothing else under any circumstances.        Physical examination  Awake, alert and oriented for time  place and persons   declined Chaperone for the visit and was adequately  draped for the exam.    There is decreased sensory to light touch on the lateral aspects of the thighs and around the   knee going down to the lateral aspect of the legs to the   lateral malleoli into the dorsum of the feet..    Deep tendon reflexes is present for the patellar tendons bilaterally.  Achilles reflexes are present bilaterally and symmetric.    Medial Hamstrings reflex is decreased bilaterally  Plantar cutaneous are downgoing.  Ankle dorsiflexion is 5/5 bilaterally.  Plantar flexion of the ankles are 5/5 bilaterally.  Big toe extension is 4/5 bilaterally  Negative Tinel's sign over the right peroneal nerve at the fibular neck.  Grcaie sign for axial loading and global rotation are negative.  No aberrant pain behavior.     Diagnosis  Problem List Items Addressed This Visit       Lumbar degenerative disc disease - Primary    Relevant Medications    oxyCODONE-acetaminophen (Percocet) 5-325 mg tablet (Start on 5/27/2024)    oxyCODONE-acetaminophen (Percocet) 5-325 mg tablet (Start on 6/24/2024)    gabapentin (Neurontin) 300 mg capsule    diclofenac sodium (Voltaren) 1 % gel    Lumbar radiculitis    Relevant Medications    oxyCODONE-acetaminophen (Percocet) 5-325 mg tablet (Start on 5/27/2024)    oxyCODONE-acetaminophen (Percocet) 5-325 mg tablet (Start on 6/24/2024)    gabapentin (Neurontin) 300 mg capsule    diclofenac sodium (Voltaren) 1 % gel    Rheumatoid arthritis of multiple sites with negative rheumatoid factor (Multi)    Relevant Medications    oxyCODONE-acetaminophen (Percocet) 5-325 mg tablet (Start on 5/27/2024)    oxyCODONE-acetaminophen (Percocet) 5-325 mg tablet (Start on 6/24/2024)    gabapentin (Neurontin) 300 mg capsule    diclofenac sodium (Voltaren) 1 % gel    Long term current use of opiate analgesic    Relevant Medications    gabapentin (Neurontin) 300 mg capsule    diclofenac sodium (Voltaren) 1 % gel         Plan  Reviewed the pain generators.  Went over the types of pain with neuropathic and nociceptive and different pathologies and therapeutic modalities. Discussed the mechanism of action of interventions from acupuncture, physical therapy , regular exercises, injections, botox, spinal cord stimulation, and role of surgery     Went over pathology of the intervertebral disc displacement and the anatomical relation to the Nerve roots and relation to the radicular symptoms. Went over treatment modalities with conservative treatment including acupuncture   and epidural steroid injection with fluoroscopy guidance and last resort of surgery    Based on the above findings and the clinical response to the opioids medications and improvement of the activities of daily living, sleep, and work performance. We made this complex decision to continue the opioids therapy in light of the evidence of the patient's responsibility in using the pain medications as prescribed for the nonmalignant chronic pain condition. We discussed about the use of the pain medications to treat the symptoms of chronic nonmalignant pain and we are not trying the repair the permanent damage in the tissues, rather we are trying to control the symptoms induced by the permanent damage to the tissues inducing the chronic pain condition and resulting disability. I explained the difference and discussed it with the patient and stressed the importance of knowing the difference especially because of the potential side effects and the potential addicting effect and habit forming nature of the dangerous drugs we are using to treat the symptoms of the chronic pain.      We discussed that we are prescribing the medications on good qi and legitimate medical reason.     We reviewed the side effects and precautions of opioids prescriptions as discussed in the opioids treatment agreement.    realizes the interaction between the therapeutic classes including the  respiratory depression and potential death     Random drug testing   we will submit     Oxycodone 5 mg qid  has a narcan at home know how and when to use it if needed.   Perlita 300 tid Long discussion about putting effort in cutting back on pain medications. Discussed about pain level changing and we do not know if the medications at this amount are still needed until we try and cut back slowly on pain medications. If the cut is tolerated then we continue. If cut not tolerated then, will go back on the pain medications level.  The goal from this is to keep the pain medications at the lowest effective dose.   Diclofenac topical      Discussed about NSAIDS and I explained about the opioids sparing effect to allow keeping the opioids dose at minimal effective dose.   I went over the potential side effects of the NSAIDS on the gastrointestinal, renal and cardiovascular systems.      I detailed the side effects from the acetaminophen in the medication and made aware of those. I also explained about the cumulative effects on the organs and mainly the liver.     Given the opioids therapy , we discussed about the risk for accidental over dose on the pain medications, either for patient or other household. I went over the mechanism of action and mode of use of the Naloxone according to the  recommendations. I will provide a prescription for a kit.     Follow-up 8 weeks or earlier if needed     The level of clinical decision making in this office visit,  is high, given the high risks of complications with the morbidity and mortality due to the fact that acute and chronic pain may pose a threat to life and bodily function, if under treated, poorly treated, or with failure to maintain adequate treatment and timely medical follow up. Additionally over treatment has its own set of complications including overdosing on the pain medications and also the habit forming potentials with the use of the medications used to  treat chronic painful conditions including therapeutic classes classified as dangerous medications. Given the serious and fluctuating nature of pain level and instensity with extensive consideration for whenever pain changes, there is always the risk of prolonged functional impairment requiring close patient monitoring with regular assessments and reassessments and high level medical decision making at every office visit. The amount and complexity of data reviewed is high given the patient clinical presentation, labs,  data, radiology reports, and other tests as discussed during office visits. Pertinent data whether positive or negative were taken in consideration in the process of making this high level medical decision.

## 2024-07-16 ENCOUNTER — APPOINTMENT (OUTPATIENT)
Dept: PAIN MEDICINE | Facility: CLINIC | Age: 74
End: 2024-07-16
Payer: COMMERCIAL

## 2024-07-16 DIAGNOSIS — M51.36 LUMBAR DEGENERATIVE DISC DISEASE: ICD-10-CM

## 2024-07-16 DIAGNOSIS — Z79.891 LONG TERM CURRENT USE OF OPIATE ANALGESIC: Primary | ICD-10-CM

## 2024-07-16 DIAGNOSIS — M54.16 LUMBAR RADICULITIS: ICD-10-CM

## 2024-07-16 DIAGNOSIS — M06.09 RHEUMATOID ARTHRITIS OF MULTIPLE SITES WITH NEGATIVE RHEUMATOID FACTOR (MULTI): ICD-10-CM

## 2024-07-16 PROCEDURE — 99214 OFFICE O/P EST MOD 30 MIN: CPT | Performed by: PHYSICAL MEDICINE & REHABILITATION

## 2024-07-16 RX ORDER — GABAPENTIN 300 MG/1
300 CAPSULE ORAL 3 TIMES DAILY
Qty: 90 CAPSULE | Refills: 1 | Status: SHIPPED | OUTPATIENT
Start: 2024-07-16 | End: 2024-08-15

## 2024-07-16 RX ORDER — OXYCODONE AND ACETAMINOPHEN 5; 325 MG/1; MG/1
1 TABLET ORAL EVERY 6 HOURS PRN
Qty: 112 TABLET | Refills: 0 | Status: SHIPPED | OUTPATIENT
Start: 2024-08-22 | End: 2024-09-19

## 2024-07-16 RX ORDER — METHOCARBAMOL 500 MG/1
500 TABLET, FILM COATED ORAL 3 TIMES DAILY
Qty: 90 TABLET | Refills: 1 | Status: SHIPPED | OUTPATIENT
Start: 2024-07-16 | End: 2024-08-15

## 2024-07-16 RX ORDER — DICLOFENAC SODIUM 10 MG/G
4 GEL TOPICAL 4 TIMES DAILY
Qty: 120 G | Refills: 2 | Status: SHIPPED | OUTPATIENT
Start: 2024-07-16

## 2024-07-16 RX ORDER — OXYCODONE AND ACETAMINOPHEN 5; 325 MG/1; MG/1
1 TABLET ORAL EVERY 6 HOURS PRN
Qty: 112 TABLET | Refills: 0 | Status: SHIPPED | OUTPATIENT
Start: 2024-07-25 | End: 2024-08-22

## 2024-07-16 NOTE — PROGRESS NOTES
Chief complaint  Back and leg pain    History  Phyllis Lara is back for pain management office visit  Continue with pain in the back and bilateral sciatica and joints pain   The pain is interfering with activities of daily living, quality of life and quality of sleep. It is limiting the functions and everything takes longer to complete because of the slowing related to the pain. Movements are cautious to avoid aggravation of the symptoms.  With pain meds she is able to function    Long discussion about putting effort in cutting back on pain medications. Discussed about pain level changing and we do not know if the medications at this amount are still needed until we try and cut back slowly on pain medications. If the cut is tolerated then we continue. If cut not tolerated then, will go back on the pain medications level.  The goal from this is to keep the pain medications at the lowest effective dose.       Pain level without medication is 8/10 , with the medication pain level 2 to 3 /10.     The pain meds are helping control the pain and improving Activities of Daily living and quality of life and quality of sleep.    opioids treatment agreement Jan 2024  Pill count today, using count tray, and in front of patient :  36    pills , last fill was on 6/27  for 112 tabs,  the count is correct  Oarrs pulled and reviewed, no concerns  last urine toxicology testing earlier this year and it was compliant we will repeat  Xray updated spine   ORT Score is  0  Pain pathology and pain generators spine   Modalities tried injection, surgery, physical therapy, TENS unit, nonsteroidal anti-inflammatory medication       Review of Systems :  Denied any fever or chills. No weight loss and no night sweats. No cough or sputum production. No diarrhea   The constipation has been responding to fibers and over the counter medications.     No bladder and bowel incontinence and no other changes in bladder and bowel. No skin changes.   "Reports tiredness and fatigability only if the pain is not controlled.   Denied opioids diversion and abuse and denies alcoholism. Denies overuse of the pain medications.  No reported euphoria sensation or getting a \"high\" on the pain medications.    The control of the pain with the pain medications is helping the control of the symptoms and allowing the function and activities of daily living, enjoyment of life, improving the quality of life and sleep with less interruption by the pain. The goal is symptomatic control of the nonmalignant chronic pain and not to repair the permanent damage in the tissues inducing the chronic pain conditions. We are aiming to shift the focus from the nonmalignant chronic pain to other aspects of life by symptomatically treating this chronic pain. If this pain is not treated it will lead to major morbidity and it is also associated with increased risks of mortality. The patient understands those very clearly and also understand high risks of morbidity and mortality if not strictly adherent to the treatment recommendations and reporting any associated side effects. Also patient understand the full responsibility associated with these medications to avoid abuse or overuse or any use of these medications for anything besides treating the patient's own chronic pain and nothing else under any circumstances.        Physical examination  Awake, alert and oriented for time place and persons   declined Chaperone for the visit and was adequately  draped for the exam.      There is decreased sensory to light touch on the lateral aspects of the thighs and around the   knee going down to the lateral aspect of the legs to the   lateral malleoli into the dorsum of the feet..    Deep tendon reflexes is present for the patellar tendons bilaterally.  Achilles reflexes are present bilaterally and symmetric.    Medial Hamstrings reflex is decreased bilaterally  Plantar cutaneous are downgoing.  Ankle " dorsiflexion is 5/5 bilaterally.  Plantar flexion of the ankles are 5/5 bilaterally.  Big toe extension is 4/5 bilaterally  Negative Tinel's sign over the right peroneal nerve at the fibular neck.  Gracie sign for axial loading and global rotation are negative.  No aberrant pain behavior.     Diagnosis  Problem List Items Addressed This Visit       Lumbar degenerative disc disease    Relevant Medications    gabapentin (Neurontin) 300 mg capsule    oxyCODONE-acetaminophen (Percocet) 5-325 mg tablet (Start on 7/25/2024)    methocarbamol (Robaxin) 500 mg tablet    diclofenac sodium (Voltaren) 1 % gel    oxyCODONE-acetaminophen (Percocet) 5-325 mg tablet (Start on 8/22/2024)    naloxegol oxalate (Movantik) 25 mg    Lumbar radiculitis    Relevant Medications    gabapentin (Neurontin) 300 mg capsule    oxyCODONE-acetaminophen (Percocet) 5-325 mg tablet (Start on 7/25/2024)    methocarbamol (Robaxin) 500 mg tablet    diclofenac sodium (Voltaren) 1 % gel    oxyCODONE-acetaminophen (Percocet) 5-325 mg tablet (Start on 8/22/2024)    naloxegol oxalate (Movantik) 25 mg    Rheumatoid arthritis of multiple sites with negative rheumatoid factor (Multi)    Relevant Medications    gabapentin (Neurontin) 300 mg capsule    oxyCODONE-acetaminophen (Percocet) 5-325 mg tablet (Start on 7/25/2024)    methocarbamol (Robaxin) 500 mg tablet    diclofenac sodium (Voltaren) 1 % gel    oxyCODONE-acetaminophen (Percocet) 5-325 mg tablet (Start on 8/22/2024)    naloxegol oxalate (Movantik) 25 mg    Long term current use of opiate analgesic - Primary    Relevant Medications    gabapentin (Neurontin) 300 mg capsule    methocarbamol (Robaxin) 500 mg tablet    diclofenac sodium (Voltaren) 1 % gel    naloxegol oxalate (Movantik) 25 mg    Other Relevant Orders    Opiate/Opioid/Benzo Prescription Compliance    Opiate/Opioid/Benzo Prescription Compliance        Plan  Reviewed the pain generators.  Went over the types of pain with neuropathic and  nociceptive and different pathologies and therapeutic modalities. Discussed the mechanism of action of interventions from acupuncture, physical therapy , regular exercises, injections, botox, spinal cord stimulation, and role of surgery     Went over pathology of the intervertebral disc displacement and the anatomical relation to the Nerve roots and relation to the radicular symptoms. Went over treatment modalities with conservative treatment including acupuncture   and epidural steroid injection with fluoroscopy guidance and last resort of surgery    Based on the above findings and the clinical response to the opioids medications and improvement of the activities of daily living, sleep, and work performance. We made this complex decision to continue the opioids therapy in light of the evidence of the patient's responsibility in using the pain medications as prescribed for the nonmalignant chronic pain condition. We discussed about the use of the pain medications to treat the symptoms of chronic nonmalignant pain and we are not trying the repair the permanent damage in the tissues, rather we are trying to control the symptoms induced by the permanent damage to the tissues inducing the chronic pain condition and resulting disability. I explained the difference and discussed it with the patient and stressed the importance of knowing the difference especially because of the potential side effects and the potential addicting effect and habit forming nature of the dangerous drugs we are using to treat the symptoms of the chronic pain.      We discussed that we are prescribing the medications on good qi and legitimate medical reason.     We reviewed the side effects and precautions of opioids prescriptions as discussed in the opioids treatment agreement.    realizes the interaction between the therapeutic classes including the respiratory depression and potential death     Random drug testing   we will submit      Continue with pain meds since getting relief   realizes the interaction between the therapeutic classes including the respiratory depression and potential death   has a narcan at home know how and when to use it if needed.     Discussed about NSAIDS and I explained about the opioids sparing effect to allow keeping the opioids dose at minimal effective dose.   I went over the potential side effects of the NSAIDS on the gastrointestinal, renal and cardiovascular systems.      I detailed the side effects from the acetaminophen in the medication and made aware of those. I also explained about the cumulative effects on the organs and mainly the liver.     Given the opioids therapy , we discussed about the risk for accidental over dose on the pain medications, either for patient or other household. I went over the mechanism of action and mode of use of the Naloxone according to the  recommendations. I will provide a prescription for a kit.     Follow-up 8 weeks or earlier if needed     The level of clinical decision making in this office visit,  is high, given the high risks of complications with the morbidity and mortality due to the fact that acute and chronic pain may pose a threat to life and bodily function, if under treated, poorly treated, or with failure to maintain adequate treatment and timely medical follow up. Additionally over treatment has its own set of complications including overdosing on the pain medications and also the habit forming potentials with the use of the medications used to treat chronic painful conditions including therapeutic classes classified as dangerous medications. Given the serious and fluctuating nature of pain level and instensity with extensive consideration for whenever pain changes, there is always the risk of prolonged functional impairment requiring close patient monitoring with regular assessments and reassessments and high level medical decision making at every  office visit. The amount and complexity of data reviewed is high given the patient clinical presentation, labs,  data, radiology reports, and other tests as discussed during office visits. Pertinent data whether positive or negative were taken in consideration in the process of making this high level medical decision.

## 2024-07-31 ENCOUNTER — LAB (OUTPATIENT)
Dept: LAB | Facility: LAB | Age: 74
End: 2024-07-31
Payer: COMMERCIAL

## 2024-07-31 DIAGNOSIS — Z79.891 LONG TERM CURRENT USE OF OPIATE ANALGESIC: ICD-10-CM

## 2024-07-31 LAB
AMPHETAMINES UR QL SCN: NORMAL
BARBITURATES UR QL SCN: NORMAL
BZE UR QL SCN: NORMAL
CANNABINOIDS UR QL SCN: NORMAL
CREAT UR-MCNC: 87.2 MG/DL (ref 20–320)
PCP UR QL SCN: NORMAL

## 2024-07-31 PROCEDURE — 80368 SEDATIVE HYPNOTICS: CPT

## 2024-07-31 PROCEDURE — 82570 ASSAY OF URINE CREATININE: CPT

## 2024-07-31 PROCEDURE — 80373 DRUG SCREENING TRAMADOL: CPT

## 2024-07-31 PROCEDURE — 80361 OPIATES 1 OR MORE: CPT

## 2024-07-31 PROCEDURE — 80307 DRUG TEST PRSMV CHEM ANLYZR: CPT

## 2024-07-31 PROCEDURE — 80358 DRUG SCREENING METHADONE: CPT

## 2024-07-31 PROCEDURE — 80346 BENZODIAZEPINES1-12: CPT

## 2024-07-31 PROCEDURE — 80354 DRUG SCREENING FENTANYL: CPT

## 2024-07-31 PROCEDURE — 80365 DRUG SCREENING OXYCODONE: CPT

## 2024-08-01 ENCOUNTER — DOCUMENTATION (OUTPATIENT)
Dept: PAIN MEDICINE | Facility: CLINIC | Age: 74
End: 2024-08-01
Payer: COMMERCIAL

## 2024-08-01 NOTE — PROGRESS NOTES
To whom it may concern,     Ms Lara is under your care for chronic pain and she is being treated for that.     Respectfully submitted,      Jose Alonso MD

## 2024-08-02 LAB
1OH-MIDAZOLAM UR CFM-MCNC: <25 NG/ML
6MAM UR CFM-MCNC: <25 NG/ML
7AMINOCLONAZEPAM UR CFM-MCNC: <25 NG/ML
A-OH ALPRAZ UR CFM-MCNC: <25 NG/ML
ALPRAZ UR CFM-MCNC: <25 NG/ML
CHLORDIAZEP UR CFM-MCNC: <25 NG/ML
CLONAZEPAM UR CFM-MCNC: <25 NG/ML
CODEINE UR CFM-MCNC: <50 NG/ML
DIAZEPAM UR CFM-MCNC: <25 NG/ML
EDDP UR CFM-MCNC: <25 NG/ML
FENTANYL UR CFM-MCNC: <2.5 NG/ML
HYDROCODONE CTO UR CFM-MCNC: <25 NG/ML
HYDROMORPHONE UR CFM-MCNC: <25 NG/ML
LORAZEPAM UR CFM-MCNC: <25 NG/ML
METHADONE UR CFM-MCNC: <25 NG/ML
MIDAZOLAM UR CFM-MCNC: <25 NG/ML
MORPHINE UR CFM-MCNC: <50 NG/ML
NORDIAZEPAM UR CFM-MCNC: <25 NG/ML
NORFENTANYL UR CFM-MCNC: <2.5 NG/ML
NORHYDROCODONE UR CFM-MCNC: <25 NG/ML
NOROXYCODONE UR CFM-MCNC: >1000 NG/ML
NORTRAMADOL UR-MCNC: <50 NG/ML
OXAZEPAM UR CFM-MCNC: <25 NG/ML
OXYCODONE UR CFM-MCNC: 660 NG/ML
OXYMORPHONE UR CFM-MCNC: 243 NG/ML
TEMAZEPAM UR CFM-MCNC: <25 NG/ML
TRAMADOL UR CFM-MCNC: <50 NG/ML
ZOLPIDEM UR CFM-MCNC: <25 NG/ML
ZOLPIDEM UR-MCNC: <25 NG/ML

## 2024-09-16 ENCOUNTER — APPOINTMENT (OUTPATIENT)
Dept: PAIN MEDICINE | Facility: CLINIC | Age: 74
End: 2024-09-16
Payer: COMMERCIAL

## 2024-09-16 DIAGNOSIS — M51.36 LUMBAR DEGENERATIVE DISC DISEASE: Primary | ICD-10-CM

## 2024-09-16 DIAGNOSIS — Z79.891 LONG TERM CURRENT USE OF OPIATE ANALGESIC: ICD-10-CM

## 2024-09-16 DIAGNOSIS — M54.16 LUMBAR RADICULITIS: ICD-10-CM

## 2024-09-16 DIAGNOSIS — M06.09 RHEUMATOID ARTHRITIS OF MULTIPLE SITES WITH NEGATIVE RHEUMATOID FACTOR (MULTI): ICD-10-CM

## 2024-09-16 PROCEDURE — 99214 OFFICE O/P EST MOD 30 MIN: CPT | Performed by: PHYSICAL MEDICINE & REHABILITATION

## 2024-09-16 RX ORDER — OXYCODONE AND ACETAMINOPHEN 5; 325 MG/1; MG/1
1 TABLET ORAL EVERY 6 HOURS PRN
Qty: 112 TABLET | Refills: 0 | Status: SHIPPED | OUTPATIENT
Start: 2024-10-18 | End: 2024-11-15

## 2024-09-16 RX ORDER — GABAPENTIN 300 MG/1
300 CAPSULE ORAL 3 TIMES DAILY
Qty: 90 CAPSULE | Refills: 1 | Status: SHIPPED | OUTPATIENT
Start: 2024-09-16 | End: 2024-10-16

## 2024-09-16 RX ORDER — METHOCARBAMOL 500 MG/1
500 TABLET, FILM COATED ORAL 3 TIMES DAILY
Qty: 90 TABLET | Refills: 1 | Status: SHIPPED | OUTPATIENT
Start: 2024-09-16 | End: 2024-10-16

## 2024-09-16 RX ORDER — OXYCODONE AND ACETAMINOPHEN 5; 325 MG/1; MG/1
1 TABLET ORAL EVERY 6 HOURS PRN
Qty: 112 TABLET | Refills: 0 | Status: SHIPPED | OUTPATIENT
Start: 2024-09-20 | End: 2024-10-18

## 2024-09-16 RX ORDER — DICLOFENAC SODIUM 10 MG/G
4 GEL TOPICAL 4 TIMES DAILY
Qty: 120 G | Refills: 2 | Status: SHIPPED | OUTPATIENT
Start: 2024-09-16

## 2024-09-16 NOTE — PROGRESS NOTES
Chief complaint  Back and lower limbs pain   Joints pain     History  Phyllis Lara is back for pain management office visit  Continue with pain in the back and lower limb. Pain meds are helping  Did not want SCS at this time  She has been cutting on pain meds and again today Long discussion about putting effort in cutting back on pain medications. Discussed about pain level changing and we do not know if the medications at this amount are still needed until we try and cut back slowly on pain medications. If the cut is tolerated then we continue. If cut not tolerated then, will go back on the pain medications level.  The goal from this is to keep the pain medications at the lowest effective dose.     Pain in the back nd bilateral legs        Pain level without medication is 8/10 , with the medication pain level 3 to 4 /10.     The pain meds are helping control the pain and improving Activities of Daily living and quality of life and quality of sleep.    opioids treatment agreement Jan 2024  Pill count today, using count tray, and in front of patient :  23    pills , last fill was on 112  for 8/23 tabs,  the count is correct  Oarrs pulled and reviewed, no concerns  last urine toxicology testing earlier this year and it was compliant we will repeat  Xray updated spine   ORT Score is  0  Pain pathology and pain generators spine   Modalities tried injection, surgery, physical therapy, TENS unit, nonsteroidal anti-inflammatory medication       Review of Systems :  Denied any fever or chills. No weight loss and no night sweats. No cough or sputum production. No diarrhea   The constipation has been responding to fibers and over the counter medications.     No bladder and bowel incontinence and no other changes in bladder and bowel. No skin changes.  Reports tiredness and fatigability only if the pain is not controlled.     Denied opioids diversion and abuse and denies alcoholism. Denies overuse of the pain medications.  No  "reported euphoria sensation or getting a \"high\" on the pain medications.    The control of the pain with the pain medications is helping the control of the symptoms and allowing the function and activities of daily living, enjoyment of life, improving the quality of life and sleep with less interruption by the pain. The goal is symptomatic control of the nonmalignant chronic pain and not to repair the permanent damage in the tissues inducing the chronic pain conditions. We are aiming to shift the focus from the nonmalignant chronic pain to other aspects of life by symptomatically treating this chronic pain. If this pain is not treated it will lead to major morbidity and it is also associated with increased risks of mortality. The patient understands those very clearly and also understand high risks of morbidity and mortality if not strictly adherent to the treatment recommendations and reporting any associated side effects. Also patient understand the full responsibility associated with these medications to avoid abuse or overuse or any use of these medications for anything besides treating the patient's own chronic pain and nothing else under any circumstances.        Physical examination  Awake, alert and oriented for time place and persons   declined Chaperone for the visit and was adequately  draped for the exam.      There is decreased sensory to light touch on the lateral aspects of the thighs and around the   knee going down to the lateral aspect of the legs to the   lateral malleoli into the dorsum of the feet..    Deep tendon reflexes is present for the patellar tendons bilaterally.  Achilles reflexes are present bilaterally and symmetric.    Medial Hamstrings reflex is decreased bilaterally  Plantar cutaneous are downgoing.  Ankle dorsiflexion is 5/5 bilaterally.  Plantar flexion of the ankles are 5/5 bilaterally.  Big toe extension is 4/5 bilaterally  Negative Tinel's sign over the right peroneal nerve at " the fibular neck.  Gracie sign for axial loading and global rotation are negative.  No aberrant pain behavior.     Diagnosis  Problem List Items Addressed This Visit       Lumbar degenerative disc disease - Primary    Relevant Medications    oxyCODONE-acetaminophen (Percocet) 5-325 mg tablet (Start on 9/20/2024)    oxyCODONE-acetaminophen (Percocet) 5-325 mg tablet (Start on 10/18/2024)    gabapentin (Neurontin) 300 mg capsule    methocarbamol (Robaxin) 500 mg tablet    diclofenac sodium (Voltaren) 1 % gel    Lumbar radiculitis    Relevant Medications    oxyCODONE-acetaminophen (Percocet) 5-325 mg tablet (Start on 9/20/2024)    oxyCODONE-acetaminophen (Percocet) 5-325 mg tablet (Start on 10/18/2024)    gabapentin (Neurontin) 300 mg capsule    methocarbamol (Robaxin) 500 mg tablet    diclofenac sodium (Voltaren) 1 % gel    Rheumatoid arthritis of multiple sites with negative rheumatoid factor (Multi)    Relevant Medications    oxyCODONE-acetaminophen (Percocet) 5-325 mg tablet (Start on 9/20/2024)    oxyCODONE-acetaminophen (Percocet) 5-325 mg tablet (Start on 10/18/2024)    gabapentin (Neurontin) 300 mg capsule    methocarbamol (Robaxin) 500 mg tablet    diclofenac sodium (Voltaren) 1 % gel    Long term current use of opiate analgesic    Relevant Medications    gabapentin (Neurontin) 300 mg capsule    methocarbamol (Robaxin) 500 mg tablet    diclofenac sodium (Voltaren) 1 % gel        Plan  Reviewed the pain generators.  Went over the types of pain with neuropathic and nociceptive and different pathologies and therapeutic modalities. Discussed the mechanism of action of interventions from acupuncture, physical therapy , regular exercises, injections, botox, spinal cord stimulation, and role of surgery     Went over pathology of the intervertebral disc displacement and the anatomical relation to the Nerve roots and relation to the radicular symptoms. Went over treatment modalities with conservative treatment  including acupuncture   and epidural steroid injection with fluoroscopy guidance and last resort of surgery    Based on the above findings and the clinical response to the opioids medications and improvement of the activities of daily living, sleep, and work performance. We made this complex decision to continue the opioids therapy in light of the evidence of the patient's responsibility in using the pain medications as prescribed for the nonmalignant chronic pain condition. We discussed about the use of the pain medications to treat the symptoms of chronic nonmalignant pain and we are not trying the repair the permanent damage in the tissues, rather we are trying to control the symptoms induced by the permanent damage to the tissues inducing the chronic pain condition and resulting disability. I explained the difference and discussed it with the patient and stressed the importance of knowing the difference especially because of the potential side effects and the potential addicting effect and habit forming nature of the dangerous drugs we are using to treat the symptoms of the chronic pain.      We discussed that we are prescribing the medications on good qi and legitimate medical reason.     We reviewed the side effects and precautions of opioids prescriptions as discussed in the opioids treatment agreement.    realizes the interaction between the therapeutic classes including the respiratory depression and potential death     Random drug testing   we will submit     Consider SCS for long term relief  LUAN for aggravation  of the pain   realizes the interaction between the therapeutic classes including the respiratory depression and potential death     Discussed about NSAIDS and I explained about the opioids sparing effect to allow keeping the opioids dose at minimal effective dose.   I went over the potential side effects of the NSAIDS on the gastrointestinal, renal and cardiovascular systems.      I detailed  the side effects from the acetaminophen in the medication and made aware of those. I also explained about the cumulative effects on the organs and mainly the liver.     Given the opioids therapy , we discussed about the risk for accidental over dose on the pain medications, either for patient or other household. I went over the mechanism of action and mode of use of the Naloxone according to the  recommendations. I will provide a prescription for a kit.     Follow-up 8 weeks or earlier if needed     The level of clinical decision making in this office visit,  is high, given the high risks of complications with the morbidity and mortality due to the fact that acute and chronic pain may pose a threat to life and bodily function, if under treated, poorly treated, or with failure to maintain adequate treatment and timely medical follow up. Additionally over treatment has its own set of complications including overdosing on the pain medications and also the habit forming potentials with the use of the medications used to treat chronic painful conditions including therapeutic classes classified as dangerous medications. Given the serious and fluctuating nature of pain level and instensity with extensive consideration for whenever pain changes, there is always the risk of prolonged functional impairment requiring close patient monitoring with regular assessments and reassessments and high level medical decision making at every office visit. The amount and complexity of data reviewed is high given the patient clinical presentation, labs,  data, radiology reports, and other tests as discussed during office visits. Pertinent data whether positive or negative were taken in consideration in the process of making this high level medical decision.

## 2024-11-07 ENCOUNTER — APPOINTMENT (OUTPATIENT)
Dept: PAIN MEDICINE | Facility: CLINIC | Age: 74
End: 2024-11-07
Payer: COMMERCIAL

## 2024-11-07 DIAGNOSIS — Z79.891 LONG TERM CURRENT USE OF OPIATE ANALGESIC: Primary | ICD-10-CM

## 2024-11-07 DIAGNOSIS — M06.09 RHEUMATOID ARTHRITIS OF MULTIPLE SITES WITH NEGATIVE RHEUMATOID FACTOR (MULTI): ICD-10-CM

## 2024-11-07 DIAGNOSIS — M54.16 LUMBAR RADICULITIS: ICD-10-CM

## 2024-11-07 DIAGNOSIS — M51.369 LUMBAR DEGENERATIVE DISC DISEASE: ICD-10-CM

## 2024-11-07 DIAGNOSIS — M51.362 DEGENERATION OF INTERVERTEBRAL DISC OF LUMBAR REGION WITH DISCOGENIC BACK PAIN AND LOWER EXTREMITY PAIN: ICD-10-CM

## 2024-11-07 PROCEDURE — 99214 OFFICE O/P EST MOD 30 MIN: CPT | Performed by: PHYSICAL MEDICINE & REHABILITATION

## 2024-11-07 RX ORDER — GABAPENTIN 300 MG/1
300 CAPSULE ORAL 3 TIMES DAILY
Qty: 90 CAPSULE | Refills: 1 | Status: SHIPPED | OUTPATIENT
Start: 2024-11-07 | End: 2024-12-07

## 2024-11-07 RX ORDER — OXYCODONE AND ACETAMINOPHEN 5; 325 MG/1; MG/1
1 TABLET ORAL EVERY 6 HOURS PRN
Qty: 112 TABLET | Refills: 0 | Status: SHIPPED | OUTPATIENT
Start: 2024-12-15 | End: 2025-01-12

## 2024-11-07 RX ORDER — METHOCARBAMOL 500 MG/1
500 TABLET, FILM COATED ORAL 3 TIMES DAILY
Qty: 90 TABLET | Refills: 1 | Status: SHIPPED | OUTPATIENT
Start: 2024-11-07 | End: 2024-12-07

## 2024-11-07 RX ORDER — DICLOFENAC SODIUM 10 MG/G
4 GEL TOPICAL 4 TIMES DAILY
Qty: 120 G | Refills: 2 | Status: SHIPPED | OUTPATIENT
Start: 2024-11-07

## 2024-11-07 RX ORDER — OXYCODONE AND ACETAMINOPHEN 5; 325 MG/1; MG/1
1 TABLET ORAL EVERY 6 HOURS PRN
Qty: 112 TABLET | Refills: 0 | Status: SHIPPED | OUTPATIENT
Start: 2024-11-17 | End: 2024-12-15

## 2024-11-07 NOTE — PROGRESS NOTES
Chief complaint  Back pain and lower limbs pain   Joints pain     History  Phyllis Lara is back for pain management office visit  the chronic non malignant pain Keeping at minimal effective dose   Pain from the back and legs and from joints inflammation  realizes the interaction between the therapeutic classes including the respiratory depression and potential death   has a narcan at home know how and when to use it if needed.   realizes the interaction between the therapeutic classes including the respiratory depression and potential death   Long discussion about putting effort in cutting back on pain medications. Discussed about pain level changing and we do not know if the medications at this amount are still needed until we try and cut back slowly on pain medications. If the cut is tolerated then we continue. If cut not tolerated then, will go back on the pain medications level.  The goal from this is to keep the pain medications at the lowest effective dose.    The pain in the back and legs is similar to before     Pain level without medication is 8/10 , with the medication pain level 2 to 3/10.     The pain meds are helping control the pain and improving Activities of Daily living and quality of life and quality of sleep.    opioids treatment agreement Jan 2024  Pill count today, using count tray, and in front of patient :  42  pills , last fill was on 10/20  for 112 tabs,  the count is correct  Oarrs pulled and reviewed, no concerns  last urine toxicology testing earlier this year and it was compliant we will repeat  Xray updated spine and joints   ORT Score is  0  Pain pathology and pain generators spine and joints   Modalities tried injection, surgery, physical therapy, TENS unit, nonsteroidal anti-inflammatory medication       Review of Systems :  Denied any fever or chills. No weight loss and no night sweats. No cough or sputum production. No diarrhea   The constipation has been responding to fibers and  "over the counter medications.     No bladder and bowel incontinence and no other changes in bladder and bowel. No skin changes.  Reports tiredness and fatigability only if the pain is not controlled.     Denied opioids diversion and abuse and denies alcoholism. Denies overuse of the pain medications.  No reported euphoria sensation or getting a \"high\" on the pain medications.    The control of the pain with the pain medications is helping the control of the symptoms and allowing the function and activities of daily living, enjoyment of life, improving the quality of life and sleep with less interruption by the pain. The goal is symptomatic control of the nonmalignant chronic pain and not to repair the permanent damage in the tissues inducing the chronic pain conditions. We are aiming to shift the focus from the nonmalignant chronic pain to other aspects of life by symptomatically treating this chronic pain. If this pain is not treated it will lead to major morbidity and it is also associated with increased risks of mortality. The patient understands those very clearly and also understand high risks of morbidity and mortality if not strictly adherent to the treatment recommendations and reporting any associated side effects. Also patient understand the full responsibility associated with these medications to avoid abuse or overuse or any use of these medications for anything besides treating the patient's own chronic pain and nothing else under any circumstances.        Physical examination  Awake, alert and oriented for time place and persons   declined Chaperone for the visit and was adequately  draped for the exam.    Hands are slightly swollen and joints stiff  Fair   Skin is smooth but stiff  plantar cutaneous reflex are down going bilaterally     Diagnosis  Problem List Items Addressed This Visit       Lumbar degenerative disc disease    Relevant Medications    oxyCODONE-acetaminophen (Percocet) 5-325 mg " tablet (Start on 11/17/2024)    oxyCODONE-acetaminophen (Percocet) 5-325 mg tablet (Start on 12/15/2024)    gabapentin (Neurontin) 300 mg capsule    methocarbamol (Robaxin) 500 mg tablet    diclofenac sodium (Voltaren) 1 % gel    Lumbar radiculitis    Relevant Medications    oxyCODONE-acetaminophen (Percocet) 5-325 mg tablet (Start on 11/17/2024)    oxyCODONE-acetaminophen (Percocet) 5-325 mg tablet (Start on 12/15/2024)    gabapentin (Neurontin) 300 mg capsule    methocarbamol (Robaxin) 500 mg tablet    diclofenac sodium (Voltaren) 1 % gel    Rheumatoid arthritis of multiple sites with negative rheumatoid factor (Multi)    Relevant Medications    oxyCODONE-acetaminophen (Percocet) 5-325 mg tablet (Start on 11/17/2024)    oxyCODONE-acetaminophen (Percocet) 5-325 mg tablet (Start on 12/15/2024)    gabapentin (Neurontin) 300 mg capsule    methocarbamol (Robaxin) 500 mg tablet    diclofenac sodium (Voltaren) 1 % gel    Long term current use of opiate analgesic - Primary    Relevant Medications    gabapentin (Neurontin) 300 mg capsule    methocarbamol (Robaxin) 500 mg tablet    diclofenac sodium (Voltaren) 1 % gel    Other Relevant Orders    Opiate/Opioid/Benzo Prescription Compliance        Plan  Reviewed the pain generators.  Went over the types of pain with neuropathic and nociceptive and different pathologies and therapeutic modalities. Discussed the mechanism of action of interventions from acupuncture, physical therapy , regular exercises, injections, botox, spinal cord stimulation, and role of surgery     Went over pathology of the intervertebral disc displacement and the anatomical relation to the Nerve roots and relation to the radicular symptoms. Went over treatment modalities with conservative treatment including acupuncture   and epidural steroid injection with fluoroscopy guidance and last resort of surgery    Based on the above findings and the clinical response to the opioids medications and improvement  of the activities of daily living, sleep, and work performance. We made this complex decision to continue the opioids therapy in light of the evidence of the patient's responsibility in using the pain medications as prescribed for the nonmalignant chronic pain condition. We discussed about the use of the pain medications to treat the symptoms of chronic nonmalignant pain and we are not trying the repair the permanent damage in the tissues, rather we are trying to control the symptoms induced by the permanent damage to the tissues inducing the chronic pain condition and resulting disability. I explained the difference and discussed it with the patient and stressed the importance of knowing the difference especially because of the potential side effects and the potential addicting effect and habit forming nature of the dangerous drugs we are using to treat the symptoms of the chronic pain.      We discussed that we are prescribing the medications on good qi and legitimate medical reason.     We reviewed the side effects and precautions of opioids prescriptions as discussed in the opioids treatment agreement.    realizes the interaction between the therapeutic classes including the respiratory depression and potential death     Random drug testing   we will submit     Pain meds did not want interventions  realizes the interaction between the therapeutic classes including the respiratory depression and potential death   Long discussion about putting effort in cutting back on pain medications. Discussed about pain level changing and we do not know if the medications at this amount are still needed until we try and cut back slowly on pain medications. If the cut is tolerated then we continue. If cut not tolerated then, will go back on the pain medications level.  The goal from this is to keep the pain medications at the lowest effective dose.     Discussed about NSAIDS and I explained about the opioids sparing effect  to allow keeping the opioids dose at minimal effective dose.   I went over the potential side effects of the NSAIDS on the gastrointestinal, renal and cardiovascular systems.      I detailed the side effects from the acetaminophen in the medication and made aware of those. I also explained about the cumulative effects on the organs and mainly the liver.     Given the opioids therapy , we discussed about the risk for accidental over dose on the pain medications, either for patient or other household. I went over the mechanism of action and mode of use of the Naloxone according to the  recommendations. I will provide a prescription for a kit.     Follow-up 8 weeks or earlier if needed     The level of clinical decision making in this office visit,  is high, given the high risks of complications with the morbidity and mortality due to the fact that acute and chronic pain may pose a threat to life and bodily function, if under treated, poorly treated, or with failure to maintain adequate treatment and timely medical follow up. Additionally over treatment has its own set of complications including overdosing on the pain medications and also the habit forming potentials with the use of the medications used to treat chronic painful conditions including therapeutic classes classified as dangerous medications. Given the serious and fluctuating nature of pain level and instensity with extensive consideration for whenever pain changes, there is always the risk of prolonged functional impairment requiring close patient monitoring with regular assessments and reassessments and high level medical decision making at every office visit. The amount and complexity of data reviewed is high given the patient clinical presentation, labs,  data, radiology reports, and other tests as discussed during office visits. Pertinent data whether positive or negative were taken in consideration in the process of making this high  level medical decision.

## 2025-01-07 ENCOUNTER — LAB (OUTPATIENT)
Dept: LAB | Facility: LAB | Age: 75
End: 2025-01-07
Payer: COMMERCIAL

## 2025-01-07 DIAGNOSIS — Z79.891 LONG TERM CURRENT USE OF OPIATE ANALGESIC: ICD-10-CM

## 2025-01-07 LAB
AMPHETAMINES UR QL SCN: NORMAL
BARBITURATES UR QL SCN: NORMAL
BZE UR QL SCN: NORMAL
CANNABINOIDS UR QL SCN: NORMAL
CREAT UR-MCNC: 100.5 MG/DL (ref 20–320)
PCP UR QL SCN: NORMAL

## 2025-01-07 PROCEDURE — 80354 DRUG SCREENING FENTANYL: CPT

## 2025-01-07 PROCEDURE — 80361 OPIATES 1 OR MORE: CPT

## 2025-01-07 PROCEDURE — 80373 DRUG SCREENING TRAMADOL: CPT

## 2025-01-07 PROCEDURE — 80307 DRUG TEST PRSMV CHEM ANLYZR: CPT

## 2025-01-07 PROCEDURE — 80346 BENZODIAZEPINES1-12: CPT

## 2025-01-07 PROCEDURE — 82570 ASSAY OF URINE CREATININE: CPT

## 2025-01-07 PROCEDURE — 80365 DRUG SCREENING OXYCODONE: CPT

## 2025-01-07 PROCEDURE — 80368 SEDATIVE HYPNOTICS: CPT

## 2025-01-07 PROCEDURE — 80358 DRUG SCREENING METHADONE: CPT

## 2025-01-09 ENCOUNTER — APPOINTMENT (OUTPATIENT)
Dept: PAIN MEDICINE | Facility: CLINIC | Age: 75
End: 2025-01-09
Payer: COMMERCIAL

## 2025-01-09 DIAGNOSIS — M51.362 DEGENERATION OF INTERVERTEBRAL DISC OF LUMBAR REGION WITH DISCOGENIC BACK PAIN AND LOWER EXTREMITY PAIN: ICD-10-CM

## 2025-01-09 DIAGNOSIS — M51.369 LUMBAR DEGENERATIVE DISC DISEASE: ICD-10-CM

## 2025-01-09 DIAGNOSIS — M54.16 LUMBAR RADICULITIS: ICD-10-CM

## 2025-01-09 DIAGNOSIS — M06.09 RHEUMATOID ARTHRITIS OF MULTIPLE SITES WITH NEGATIVE RHEUMATOID FACTOR (MULTI): Primary | ICD-10-CM

## 2025-01-09 DIAGNOSIS — Z79.891 LONG TERM CURRENT USE OF OPIATE ANALGESIC: ICD-10-CM

## 2025-01-09 PROCEDURE — 1159F MED LIST DOCD IN RCRD: CPT | Performed by: PHYSICAL MEDICINE & REHABILITATION

## 2025-01-09 PROCEDURE — G2211 COMPLEX E/M VISIT ADD ON: HCPCS | Performed by: PHYSICAL MEDICINE & REHABILITATION

## 2025-01-09 PROCEDURE — 1036F TOBACCO NON-USER: CPT | Performed by: PHYSICAL MEDICINE & REHABILITATION

## 2025-01-09 PROCEDURE — 1125F AMNT PAIN NOTED PAIN PRSNT: CPT | Performed by: PHYSICAL MEDICINE & REHABILITATION

## 2025-01-09 PROCEDURE — 99214 OFFICE O/P EST MOD 30 MIN: CPT | Performed by: PHYSICAL MEDICINE & REHABILITATION

## 2025-01-09 RX ORDER — DICLOFENAC SODIUM 10 MG/G
4 GEL TOPICAL 4 TIMES DAILY
Qty: 120 G | Refills: 2 | Status: SHIPPED | OUTPATIENT
Start: 2025-01-09

## 2025-01-09 RX ORDER — OXYCODONE AND ACETAMINOPHEN 5; 325 MG/1; MG/1
1 TABLET ORAL EVERY 6 HOURS PRN
Qty: 100 TABLET | Refills: 0 | Status: SHIPPED | OUTPATIENT
Start: 2025-01-12 | End: 2025-02-09

## 2025-01-09 RX ORDER — GABAPENTIN 300 MG/1
300 CAPSULE ORAL 3 TIMES DAILY
Qty: 90 CAPSULE | Refills: 1 | Status: SHIPPED | OUTPATIENT
Start: 2025-01-09 | End: 2025-02-08

## 2025-01-09 RX ORDER — OXYCODONE AND ACETAMINOPHEN 5; 325 MG/1; MG/1
1 TABLET ORAL EVERY 6 HOURS PRN
Qty: 100 TABLET | Refills: 0 | Status: SHIPPED | OUTPATIENT
Start: 2025-02-09 | End: 2025-03-09

## 2025-01-09 ASSESSMENT — ANXIETY QUESTIONNAIRES
GAD7 TOTAL SCORE: 0
7. FEELING AFRAID AS IF SOMETHING AWFUL MIGHT HAPPEN: NOT AT ALL
2. NOT BEING ABLE TO STOP OR CONTROL WORRYING: NOT AT ALL
1. FEELING NERVOUS, ANXIOUS, OR ON EDGE: NOT AT ALL
5. BEING SO RESTLESS THAT IT IS HARD TO SIT STILL: NOT AT ALL
6. BECOMING EASILY ANNOYED OR IRRITABLE: NOT AT ALL
3. WORRYING TOO MUCH ABOUT DIFFERENT THINGS: NOT AT ALL
4. TROUBLE RELAXING: NOT AT ALL

## 2025-01-09 ASSESSMENT — PATIENT HEALTH QUESTIONNAIRE - PHQ9
1. LITTLE INTEREST OR PLEASURE IN DOING THINGS: NOT AT ALL
2. FEELING DOWN, DEPRESSED OR HOPELESS: NOT AT ALL
SUM OF ALL RESPONSES TO PHQ9 QUESTIONS 1 AND 2: 0

## 2025-01-09 ASSESSMENT — ENCOUNTER SYMPTOMS
LOSS OF SENSATION IN FEET: 0
DEPRESSION: 0
OCCASIONAL FEELINGS OF UNSTEADINESS: 0

## 2025-01-09 ASSESSMENT — PAIN SCALES - GENERAL: PAINLEVEL_OUTOF10: 7

## 2025-01-09 ASSESSMENT — PAIN - FUNCTIONAL ASSESSMENT: PAIN_FUNCTIONAL_ASSESSMENT: 0-10

## 2025-01-09 NOTE — PROGRESS NOTES
Chief complaint  Spine pain as well as pain from multiple joints from inflammatory arthritis    History  Phyllis Lara is back for pain management office visit  She continues to have the pain in the back down the lower limb.  The pain is deep achy stabbing worse with movement.  It does wake her up on intermittent basis.  In addition she does have inflammatory pain at the joint injection does have some stiffness in the morning however since she started on medication for her lupus and rheumatoid the stiffness has gotten to 0 point that she continues to have the pain.  We are treating her with the pain medication but she understands that the treatment with pain medication is only symptomatic.  She also needs the treatment from rheumatology to control the disorder    Long discussion about putting effort in cutting back on pain medications. Discussed about pain level changing and we do not know if the medications at this amount are still needed until we try and cut back slowly on pain medications. If the cut is tolerated then we continue. If cut not tolerated then, will go back on the pain medications level.  The goal from this is to keep the pain medications at the lowest effective dose.    Pain level without medication is 7 or 8/10 , with the medication pain level 2 or 3/10.     The pain meds are helping control the pain and improving Activities of Daily living and quality of life and quality of sleep.    opioids treatment agreement today  Pill count today, using count tray, and in front of patient :  12    pills , last fill was on 12/15  for 112 tabs,  the count is correct  Oarrs pulled and reviewed, no concerns  last urine toxicology testing earlier this year and it was compliant we will repeat  Xray updated spine  ORT Score is  0  Pain pathology and pain generators spine   Modalities tried injection, surgery, physical therapy, TENS unit, nonsteroidal anti-inflammatory medication       Review of Systems :  Denied any  "fever or chills. No weight loss and no night sweats. No cough or sputum production. No diarrhea   The constipation has been responding to fibers and over the counter medications.     No bladder and bowel incontinence and no other changes in bladder and bowel. No skin changes.  Reports tiredness and fatigability only if the pain is not controlled.     Denied opioids diversion and abuse and denies alcoholism. Denies overuse of the pain medications.  No reported euphoria sensation or getting a \"high\" on the pain medications.    The control of the pain with the pain medications is helping the control of the symptoms and allowing the function and activities of daily living, enjoyment of life, improving the quality of life and sleep with less interruption by the pain. The goal is symptomatic control of the nonmalignant chronic pain and not to repair the permanent damage in the tissues inducing the chronic pain conditions. We are aiming to shift the focus from the nonmalignant chronic pain to other aspects of life by symptomatically treating this chronic pain. If this pain is not treated it will lead to major morbidity and it is also associated with increased risks of mortality. The patient understands those very clearly and also understand high risks of morbidity and mortality if not strictly adherent to the treatment recommendations and reporting any associated side effects. Also patient understand the full responsibility associated with these medications to avoid abuse or overuse or any use of these medications for anything besides treating the patient's own chronic pain and nothing else under any circumstances.        Physical examination  Awake, alert and oriented for time place and persons   My nurse  Reji ZAZUETA LPN   was present during the entire history and physical examination      Swelling and tenderness around the wrists and elbows.  Reversal of lumbar lordosis.  Straight leg raising increase the pain in the back " and down the leg.  Gracie's testing are negative.  She uses a cane because of the knee pain.  No aberrant pain behavior plantar cutaneous abdomen going downwards on both sides    Diagnosis  Problem List Items Addressed This Visit       Lumbar degenerative disc disease    Relevant Medications    gabapentin (Neurontin) 300 mg capsule    oxyCODONE-acetaminophen (Percocet) 5-325 mg tablet (Start on 1/12/2025)    oxyCODONE-acetaminophen (Percocet) 5-325 mg tablet (Start on 2/9/2025)    diclofenac sodium (Voltaren) 1 % gel    Lumbar radiculitis    Relevant Medications    gabapentin (Neurontin) 300 mg capsule    oxyCODONE-acetaminophen (Percocet) 5-325 mg tablet (Start on 1/12/2025)    oxyCODONE-acetaminophen (Percocet) 5-325 mg tablet (Start on 2/9/2025)    diclofenac sodium (Voltaren) 1 % gel    Rheumatoid arthritis of multiple sites with negative rheumatoid factor (Multi) - Primary    Relevant Medications    gabapentin (Neurontin) 300 mg capsule    oxyCODONE-acetaminophen (Percocet) 5-325 mg tablet (Start on 1/12/2025)    oxyCODONE-acetaminophen (Percocet) 5-325 mg tablet (Start on 2/9/2025)    diclofenac sodium (Voltaren) 1 % gel    Long term current use of opiate analgesic    Relevant Medications    gabapentin (Neurontin) 300 mg capsule    diclofenac sodium (Voltaren) 1 % gel        Plan  Reviewed the pain generators.  Went over the types of pain with neuropathic and nociceptive and different pathologies and therapeutic modalities. Discussed the mechanism of action of interventions from acupuncture, physical therapy , regular exercises, injections, botox, spinal cord stimulation, and role of surgery     Went over pathology of the intervertebral disc displacement and the anatomical relation to the Nerve roots and relation to the radicular symptoms. Went over treatment modalities with conservative treatment including acupuncture   and epidural steroid injection with fluoroscopy guidance and last resort of  surgery    Based on the above findings and the clinical response to the opioids medications and improvement of the activities of daily living, sleep, and work performance. We made this complex decision to continue the opioids therapy in light of the evidence of the patient's responsibility in using the pain medications as prescribed for the nonmalignant chronic pain condition. We discussed about the use of the pain medications to treat the symptoms of chronic nonmalignant pain and we are not trying the repair the permanent damage in the tissues, rather we are trying to control the symptoms induced by the permanent damage to the tissues inducing the chronic pain condition and resulting disability. I explained the difference and discussed it with the patient and stressed the importance of knowing the difference especially because of the potential side effects and the potential addicting effect and habit forming nature of the dangerous drugs we are using to treat the symptoms of the chronic pain.      We discussed that we are prescribing the medications on good qi and legitimate medical reason.     We reviewed the side effects and precautions of opioids prescriptions as discussed in the opioids treatment agreement.    realizes the interaction between the therapeutic classes including the respiratory depression and potential death     Random drug testing   we will submit     At this time I discussed with her about we should consider stopping the medication for multiple reasons for the decrease in tolerance and finding on effective dose and also because of her metabolism is slowing down at 74 years old and we do not want to run into situation where the medication are accumulating because of slower elimination  Cut back oxycodone to 100 tab for the month  Gabapentin 300 mg tid  Also continue with topical Nsaid gel    Discussed about NSAIDS and I explained about the opioids sparing effect to allow keeping the opioids  dose at minimal effective dose.   I went over the potential side effects of the NSAIDS on the gastrointestinal, renal and cardiovascular systems.      I detailed the side effects from the acetaminophen in the medication and made aware of those. I also explained about the cumulative effects on the organs and mainly the liver.     Given the opioids therapy , we discussed about the risk for accidental over dose on the pain medications, either for patient or other household. I went over the mechanism of action and mode of use of the Naloxone according to the  recommendations. I will provide a prescription for a kit.     Follow-up 8 weeks or earlier if needed     The level of clinical decision making in this office visit,  is high, given the high risks of complications with the morbidity and mortality due to the fact that acute and chronic pain may pose a threat to life and bodily function, if under treated, poorly treated, or with failure to maintain adequate treatment and timely medical follow up. Additionally over treatment has its own set of complications including overdosing on the pain medications and also the habit forming potentials with the use of the medications used to treat chronic painful conditions including therapeutic classes classified as dangerous medications. Given the serious and fluctuating nature of pain level and instensity with extensive consideration for whenever pain changes, there is always the risk of prolonged functional impairment requiring close patient monitoring with regular assessments and reassessments and high level medical decision making at every office visit. The amount and complexity of data reviewed is high given the patient clinical presentation, labs,  data, radiology reports, and other tests as discussed during office visits. Pertinent data whether positive or negative were taken in consideration in the process of making this high level medical  decision.

## 2025-01-10 LAB

## 2025-03-06 ENCOUNTER — APPOINTMENT (OUTPATIENT)
Dept: PAIN MEDICINE | Facility: CLINIC | Age: 75
End: 2025-03-06
Payer: COMMERCIAL

## 2025-03-06 DIAGNOSIS — M06.09 RHEUMATOID ARTHRITIS OF MULTIPLE SITES WITH NEGATIVE RHEUMATOID FACTOR (MULTI): ICD-10-CM

## 2025-03-06 DIAGNOSIS — M54.16 LUMBAR RADICULITIS: ICD-10-CM

## 2025-03-06 DIAGNOSIS — M51.362 DEGENERATION OF INTERVERTEBRAL DISC OF LUMBAR REGION WITH DISCOGENIC BACK PAIN AND LOWER EXTREMITY PAIN: Primary | ICD-10-CM

## 2025-03-06 DIAGNOSIS — M51.369 LUMBAR DEGENERATIVE DISC DISEASE: ICD-10-CM

## 2025-03-06 PROCEDURE — 99214 OFFICE O/P EST MOD 30 MIN: CPT | Performed by: PHYSICAL MEDICINE & REHABILITATION

## 2025-03-06 PROCEDURE — G2211 COMPLEX E/M VISIT ADD ON: HCPCS | Performed by: PHYSICAL MEDICINE & REHABILITATION

## 2025-03-06 RX ORDER — OXYCODONE AND ACETAMINOPHEN 5; 325 MG/1; MG/1
1 TABLET ORAL EVERY 6 HOURS PRN
Qty: 100 TABLET | Refills: 0 | Status: SHIPPED | OUTPATIENT
Start: 2025-04-06 | End: 2025-05-04

## 2025-03-06 RX ORDER — OXYCODONE AND ACETAMINOPHEN 5; 325 MG/1; MG/1
1 TABLET ORAL EVERY 6 HOURS PRN
Qty: 100 TABLET | Refills: 0 | Status: SHIPPED | OUTPATIENT
Start: 2025-03-09 | End: 2025-04-06

## 2025-03-06 NOTE — PROGRESS NOTES
Chief complaint  Back and lower limbs pain   Joint pain        History  Phyllis Lara is back for pain management office visit  Phyllis Lara was at home.  Could not physically come to the office today .  I was at the office.  I used secure audiovisual communication provided by the Epic system. Phyllis Lara  agreed on this form of communication and consented to the visit via A/V method.  The patient also understood that this will be billed as office visit.     Was in the hospital for migraines she is out now and getting treatment she is stable  With pain control she is able to function.  She is still working as a  she want to continue with that for encounter.  With the pain control she is able to control the pain from the back and the joint and allow her to work of note that the pain in the back is related to lumbar degenerative disc disease.  She have autoimmune disorder affecting her joint she is getting rheumatological treatment for that but she needs a pain control to allow her to function by controlling the pain from the already damaged joints.  Of note that when she started with as she was taking long-acting formulation and short acting formulation we did cut back the long-acting formulation a while ago and we are slowly working on the short acting formulation.  The goal is to find a lower effective dose without increasing her pain and without subjecting her to any unnecessary withdrawal    Detailed discussion and explanation about the need to try  and cut back on pain medications.  Entertained question about   pain level changing from acute, subacute to chronic pain.  Currently the pain and chronic.  The higher amount of medication were for the acute and subacute phase.  Therefore   we do not know exactly if the medications at this amount are still needed until we try and cut back slowly on pain medications. If the cut is tolerated then we continue trying to cut back further. If cut not tolerated then,  will try additional none chemical methods like injection or physical therapy or we can go back on the pain medications level.  The goal from this is to keep the pain medications at the lowest effective dose and to control the tolerance that develops from the chronic use of opioid therapy.  Our goal is to keep the pain controlled with minimal effective dose.  That will help cutting back on the potential for side effects, and also will help decrease the amount of tolerance developing from the chronic use of opioid medications.    Pain level without medication is 8/10 , with the medication pain level between 2 and 3/10.     Pain disability index (PDI) improvement by 3-4 points, across different functional categories, with the pain control with the meds.  Went over the questionnaires during the AV visit today. Will fill the forms at the next in person visit.      The pain meds are helping control the pain and improving Activities of Daily living and quality of life and quality of sleep.    opioids treatment agreement January 2025    Last fill was on 2/9/2025 for 100 tabs . Reported on schedule.    Oarrs pulled and reviewed, no concerns  last urine toxicology testing was compliant this was done on : jan 2025  Xray updated spine  ORT (opioid risk tool) score is 0  Pain pathology and pain generators spine  Modalities tried injection, surgery, physical therapy, TENS unit, nonsteroidal anti-inflammatory medication multiple injections    Review of Systems :  Denied any fever or chills. No weight loss and no night sweats. No cough or sputum production. No diarrhea   The constipation has been responding to fibers and over the counter medications.     No bladder and bowel incontinence and no other changes in bladder and bowel. No skin changes.  Reports tiredness and fatigability only if the pain is not controlled.     Denied opioids diversion and abuse and denies alcoholism. Denies overuse of the pain medications.  No reported  "euphoria sensation or getting a \"high\" on the pain medications.    The control of the pain with the pain medications is helping the control of the symptoms and allowing the function and activities of daily living, enjoyment of life, improving the quality of life and sleep with less interruption by the pain. The goal is symptomatic control of the nonmalignant chronic pain and not to repair the permanent damage in the tissues inducing the chronic pain conditions. We are aiming to shift the focus from the nonmalignant chronic pain to other aspects of life by symptomatically treating this chronic pain. If this pain is not treated it will lead to major morbidity and it is also associated with increased risks of mortality. The patient understands those very clearly and also understand high risks of morbidity and mortality if not strictly adherent to the treatment recommendations and reporting any associated side effects. Also patient understand the full responsibility associated with these medications to avoid abuse or overuse or any use of these medications for anything besides treating the patient's own chronic pain and nothing else under any circumstances.        Physical examination  Awake, alert and oriented for time place and persons   Pupils are equal and reactive to light and accommodation    This is a secure A/V visit    Diagnosis  Problem List Items Addressed This Visit       Lumbar degenerative disc disease - Primary    Relevant Medications    oxyCODONE-acetaminophen (Percocet) 5-325 mg tablet (Start on 3/9/2025)    oxyCODONE-acetaminophen (Percocet) 5-325 mg tablet (Start on 4/6/2025)    Lumbar radiculitis    Relevant Medications    oxyCODONE-acetaminophen (Percocet) 5-325 mg tablet (Start on 3/9/2025)    oxyCODONE-acetaminophen (Percocet) 5-325 mg tablet (Start on 4/6/2025)    Rheumatoid arthritis of multiple sites with negative rheumatoid factor (Multi)    Relevant Medications    oxyCODONE-acetaminophen " (Percocet) 5-325 mg tablet (Start on 3/9/2025)    oxyCODONE-acetaminophen (Percocet) 5-325 mg tablet (Start on 4/6/2025)        Plan  Reviewed the pain generators.  Went over the types of pain with neuropathic and nociceptive and different pathologies and therapeutic modalities. Discussed the mechanism of action of interventions from acupuncture, physical therapy , regular exercises, injections, botox, spinal cord stimulation, and role of surgery     Went over pathology of the intervertebral disc displacement and the anatomical relation to the Nerve roots and relation to the radicular symptoms. Went over treatment modalities with conservative treatment including acupuncture   and epidural steroid injection with fluoroscopy guidance and last resort of surgery    Based on the above findings and the clinical response to the opioids medications and improvement of the activities of daily living, sleep, and work performance. We made this complex decision to continue the opioids therapy in light of the evidence of the patient's responsibility in using the pain medications as prescribed for the nonmalignant chronic pain condition. We discussed about the use of the pain medications to treat the symptoms of chronic nonmalignant pain and we are not trying the repair the permanent damage in the tissues, rather we are trying to control the symptoms induced by the permanent damage to the tissues inducing the chronic pain condition and resulting disability. I explained the difference and discussed it with the patient and stressed the importance of knowing the difference especially because of the potential side effects and the potential addicting effect and habit forming nature of the dangerous drugs we are using to treat the symptoms of the chronic pain.      We discussed that we are prescribing the medications on good qi and legitimate medical reason within the scope of professional medical practice.     We reviewed the side  effects and precautions of opioids prescriptions as discussed in the opioids treatment agreement.    realizes the interaction between the therapeutic classes including the respiratory depression and potential death     Random drug testing   we will submit     Continue with oxycodone at 100 tablets for the month.  We will cut back further with the weather permitting.  Usually warmer weather improve her symptoms colder weather aggravates the pain  Consider injection for aggravation of the symptoms  Discussed about NSAIDS and I explained about the opioids sparing effect to allow keeping the opioids dose at minimal effective dose.   I went over the potential side effects of the NSAIDS on the gastrointestinal, renal and cardiovascular systems.      I detailed the side effects from the acetaminophen in the medication and made aware of those. I also explained about the cumulative effects on the organs and mainly the liver.     Given the opioids therapy , we discussed about the risk for accidental over dose on the pain medications, either for patient or other household. I went over the mechanism of action and mode of use of the Naloxone according to the  recommendations. I will provide a prescription for a kit.     Follow-up 8 weeks or earlier if needed     The level of clinical decision making in this office visit,  is high, given the high risks of complications with the morbidity and mortality due to the fact that acute and chronic pain may pose a threat to life and bodily function, if under treated, poorly treated, or with failure to maintain adequate treatment and timely medical follow up. Additionally over treatment has its own set of complications including overdosing on the pain medications and also the habit forming potentials with the use of the medications used to treat chronic painful conditions including therapeutic classes classified as dangerous medications. Given the serious and fluctuating nature  of pain level and instensity with extensive consideration for whenever pain changes, there is always the risk of prolonged functional impairment requiring close patient monitoring with regular assessments and reassessments and high level medical decision making at every office visit. The amount and complexity of data reviewed is high given the patient clinical presentation, labs,  data, radiology reports, and other tests as discussed during office visits. Pertinent data whether positive or negative were taken in consideration in the process of making this high level medical decision.

## 2025-04-29 ENCOUNTER — APPOINTMENT (OUTPATIENT)
Dept: PAIN MEDICINE | Facility: CLINIC | Age: 75
End: 2025-04-29
Payer: COMMERCIAL

## 2025-04-29 DIAGNOSIS — M54.16 LUMBAR RADICULITIS: ICD-10-CM

## 2025-04-29 DIAGNOSIS — M06.09 RHEUMATOID ARTHRITIS OF MULTIPLE SITES WITH NEGATIVE RHEUMATOID FACTOR (MULTI): ICD-10-CM

## 2025-04-29 DIAGNOSIS — M51.369 LUMBAR DEGENERATIVE DISC DISEASE: ICD-10-CM

## 2025-04-29 PROCEDURE — G2211 COMPLEX E/M VISIT ADD ON: HCPCS | Performed by: PHYSICAL MEDICINE & REHABILITATION

## 2025-04-29 PROCEDURE — 99214 OFFICE O/P EST MOD 30 MIN: CPT | Performed by: PHYSICAL MEDICINE & REHABILITATION

## 2025-04-29 RX ORDER — OXYCODONE AND ACETAMINOPHEN 5; 325 MG/1; MG/1
1 TABLET ORAL EVERY 6 HOURS PRN
Qty: 100 TABLET | Refills: 0 | Status: SHIPPED | OUTPATIENT
Start: 2025-05-05 | End: 2025-06-02

## 2025-04-29 RX ORDER — OXYCODONE AND ACETAMINOPHEN 5; 325 MG/1; MG/1
1 TABLET ORAL EVERY 6 HOURS PRN
Qty: 100 TABLET | Refills: 0 | Status: SHIPPED | OUTPATIENT
Start: 2025-06-02 | End: 2025-06-30

## 2025-04-29 NOTE — PROGRESS NOTES
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   Also having pain in the joints from RA  The pain is mechanical and also having inflammatory and stiffness   Meds are controlling the symptoms  Cutting back ont eh pain meds that is working and  we discussed about emerging data about tapering opioid therapy for chronic nonmalignant pain.  These are showing increasing evidence of improving the chronic pain itself, anxiety and depression, with the tapering of opioid therapy for chronic nonmalignant pain.  These are additional benefits to the above that we have been discussing.  As long as the cut back is done progressively and safely which we are doing.     Curently at 100 tabs for oxycodone 5/325 for 28 days  That is working allowing her to function.     Pain level without medication is 8/10 , with the medication pain level 2 to 3 /10.     Pain disability index (PDI) improvement   across different functional categories, with the pain control with the meds. Forms filled by patient are scanned in the chart    The pain meds are helping control the pain and improving Activities of Daily living and quality of life and quality of sleep.    opioids treatment agreement Jan 2025    Pill count today, using count tray, and in front of patient, Nurse and myself :  20    pills , last fill was on 4/6  for 100 tabs,  the meds pill count today is correct  Oarrs pulled and reviewed, no concerns  last urine toxicology testing was compliant this was done on : Jan 2025  Xray updated spine   ORT (opioid risk tool) score is   0  Pain pathology and pain generators spine and joints   Modalities tried  "injection, surgery, physical therapy, TENS unit, nonsteroidal anti-inflammatory medication       Review of Systems :  Denied any fever or chills. No weight loss and no night sweats. No cough or sputum production. No diarrhea   The constipation has been responding to fibers and over the counter medications.     No bladder and bowel incontinence and no other changes in bladder and bowel. No skin changes.  Reports tiredness and fatigability only if the pain is not controlled.     I further Asked about symptoms or changes affecting the vision, hearing, breathing, digestive system, urinary symptoms, skin, other musculoskeletal condition, neurological conditions these are negative except as detailed in the history and physical examination above    Denied opioids diversion and abuse and denies alcoholism. Denies overuse of the pain medications.  No reported euphoria sensation or getting a \"high\" on the pain medications.    The control of the pain with the pain medications is helping the control of the symptoms and allowing the function and activities of daily living, enjoyment of life, improving the quality of life and sleep with less interruption by the pain. The goal is symptomatic control of the nonmalignant chronic pain and not to repair the permanent damage in the tissues inducing the chronic pain conditions. We are aiming to shift the focus from the nonmalignant chronic pain to other aspects of life by symptomatically treating this chronic pain. If this pain is not treated it will lead to major morbidity and it is also associated with increased risks of mortality. The patient understands those very clearly and also understand high risks of morbidity and mortality if not strictly adherent to the treatment recommendations and reporting any associated side effects. Also patient understand the full responsibility associated with these medications to avoid abuse or overuse or any use of these medications for anything " besides treating the patient's own chronic pain and nothing else under any circumstances.        Physical examination  Awake, alert and oriented for time place and persons   Pupils are equal and reactive to light and accommodation    Pain over hte wrist and PIP and DIP  SLR increased pain in the back  plantar cutaneous reflex are down going bilaterally   SLR increased the pain in the back     Diagnosis  Problem List Items Addressed This Visit       Lumbar degenerative disc disease    Relevant Medications    oxyCODONE-acetaminophen (Percocet) 5-325 mg tablet (Start on 5/5/2025)    oxyCODONE-acetaminophen (Percocet) 5-325 mg tablet (Start on 6/2/2025)    Lumbar radiculitis    Relevant Medications    oxyCODONE-acetaminophen (Percocet) 5-325 mg tablet (Start on 5/5/2025)    oxyCODONE-acetaminophen (Percocet) 5-325 mg tablet (Start on 6/2/2025)    Rheumatoid arthritis of multiple sites with negative rheumatoid factor (Multi)    Relevant Medications    oxyCODONE-acetaminophen (Percocet) 5-325 mg tablet (Start on 5/5/2025)    oxyCODONE-acetaminophen (Percocet) 5-325 mg tablet (Start on 6/2/2025)        Plan  Reviewed the pain generators.  Went over the types of pain with neuropathic and nociceptive and different pathologies and therapeutic modalities. Discussed the mechanism of action of interventions from acupuncture, physical therapy , regular exercises, injections, botox, spinal cord stimulation, and role of surgery     Went over pathology of the intervertebral disc displacement and the anatomical relation to the Nerve roots and relation to the radicular symptoms. Went over treatment modalities with conservative treatment including acupuncture   and epidural steroid injection with fluoroscopy guidance and last resort of surgery    Based on the above findings and the clinical response to the opioids medications and improvement of the activities of daily living, sleep, and work performance. We made this complex decision  to continue the opioids therapy in light of the evidence of the patient's responsibility in using the pain medications as prescribed for the nonmalignant chronic pain condition. We discussed about the use of the pain medications to treat the symptoms of chronic nonmalignant pain and we are not trying the repair the permanent damage in the tissues, rather we are trying to control the symptoms induced by the permanent damage to the tissues inducing the chronic pain condition and resulting disability. I explained the difference and discussed it with the patient and stressed the importance of knowing the difference especially because of the potential side effects and the potential addicting effect and habit forming nature of the dangerous drugs we are using to treat the symptoms of the chronic pain.      We discussed that we are prescribing the medications on good qi and legitimate medical reason within the scope of professional medical practice.     We reviewed the side effects and precautions of opioids prescriptions as discussed in the opioids treatment agreement.    realizes the interaction between the therapeutic classes including the respiratory depression and potential death     Random drug testing   we will submit     Oxycodone 5 , 100 tabs for the 28 days  Consider LUAN fro the aggravation      Discussed about NSAIDS and I explained about the opioids sparing effect to allow keeping the opioids dose at minimal effective dose.   I went over the potential side effects of the NSAIDS on the gastrointestinal, renal and cardiovascular systems.      I detailed the side effects from the acetaminophen in the medication and made aware of those. I also explained about the cumulative effects on the organs and mainly the liver.     Given the opioids therapy , we discussed about the risk for accidental over dose on the pain medications, either for patient or other household. I went over the mechanism of action and mode of  use of the Naloxone according to the  recommendations. I will provide a prescription for a kit.     Follow-up 8 weeks or earlier if needed     The level of clinical decision making in this office visit,  is high, given the high risks of complications with the morbidity and mortality due to the fact that acute and chronic pain may pose a threat to life and bodily function, if under treated, poorly treated, or with failure to maintain adequate treatment and timely medical follow up. Additionally over treatment has its own set of complications including overdosing on the pain medications and also the habit forming potentials with the use of the medications used to treat chronic painful conditions including therapeutic classes classified as dangerous medications. Given the serious and fluctuating nature of pain level and instensity with extensive consideration for whenever pain changes, there is always the risk of prolonged functional impairment requiring close patient monitoring with regular assessments and reassessments and high level medical decision making at every office visit. The amount and complexity of data reviewed is high given the patient clinical presentation, labs,  data, radiology reports, and other tests as discussed during office visits. Pertinent data whether positive or negative were taken in consideration in the process of making this high level medical decision.

## 2025-05-01 ENCOUNTER — APPOINTMENT (OUTPATIENT)
Dept: PAIN MEDICINE | Facility: CLINIC | Age: 75
End: 2025-05-01
Payer: COMMERCIAL

## 2025-06-24 ENCOUNTER — APPOINTMENT (OUTPATIENT)
Dept: PAIN MEDICINE | Facility: CLINIC | Age: 75
End: 2025-06-24
Payer: COMMERCIAL

## 2025-06-24 DIAGNOSIS — Z79.891 LONG TERM CURRENT USE OF OPIATE ANALGESIC: ICD-10-CM

## 2025-06-24 DIAGNOSIS — M54.16 LUMBAR RADICULITIS: ICD-10-CM

## 2025-06-24 DIAGNOSIS — M06.09 RHEUMATOID ARTHRITIS OF MULTIPLE SITES WITH NEGATIVE RHEUMATOID FACTOR (MULTI): ICD-10-CM

## 2025-06-24 DIAGNOSIS — M51.369 LUMBAR DEGENERATIVE DISC DISEASE: ICD-10-CM

## 2025-06-24 PROCEDURE — 1159F MED LIST DOCD IN RCRD: CPT | Performed by: PHYSICAL MEDICINE & REHABILITATION

## 2025-06-24 PROCEDURE — G2211 COMPLEX E/M VISIT ADD ON: HCPCS | Performed by: PHYSICAL MEDICINE & REHABILITATION

## 2025-06-24 PROCEDURE — 99214 OFFICE O/P EST MOD 30 MIN: CPT | Performed by: PHYSICAL MEDICINE & REHABILITATION

## 2025-06-24 PROCEDURE — 1160F RVW MEDS BY RX/DR IN RCRD: CPT | Performed by: PHYSICAL MEDICINE & REHABILITATION

## 2025-06-24 RX ORDER — OXYCODONE AND ACETAMINOPHEN 5; 325 MG/1; MG/1
1 TABLET ORAL EVERY 6 HOURS PRN
Qty: 100 TABLET | Refills: 0 | Status: SHIPPED | OUTPATIENT
Start: 2025-07-02 | End: 2025-07-30

## 2025-06-24 RX ORDER — GABAPENTIN 300 MG/1
300 CAPSULE ORAL 3 TIMES DAILY
Qty: 90 CAPSULE | Refills: 1 | Status: SHIPPED | OUTPATIENT
Start: 2025-06-24 | End: 2025-07-24

## 2025-06-24 RX ORDER — OXYCODONE AND ACETAMINOPHEN 5; 325 MG/1; MG/1
1 TABLET ORAL EVERY 6 HOURS PRN
Qty: 100 TABLET | Refills: 0 | Status: SHIPPED | OUTPATIENT
Start: 2025-07-30 | End: 2025-08-27

## 2025-06-24 NOTE — PROGRESS NOTES
Chief complaint  Back and lower limbs pain   Joints pain      Phyllis Lara was at home in [place].  Could not physically come to the office today .  I was at the office.  I used secure audiovisual communication provided by the Epic system. Phyllis Lara  agreed on this form of communication and consented to the visit via A/V method.  The patient also understood that this will be billed as office visit.     History  Phyllis Lara is back for pain management office visit  We are treating the pain from multiple generators.  The pain deep achy stabbing in the back in addition she does have pain from multiple joints related to inflammatory arthritis/' she does follow-up with dermatology.  She understands that the treatment with rheumatology is a special treatment but only treating the pain.  She have to have the rheumatological treatment to stop the disease process the pain control only stops the pain  She had injections in the past those helped temporarily discussed the cervical spine posterior to her for sciatica pain but she wants to hold off.    Discussed with her about cutting back on the pain medication usually help with the chronic pain anxiety and depression.      Pain level without medication is 8/10 , with the medication pain level 2 and 3/10.     Pain disability index (PDI) improvement   across different functional categories, with the pain control with the meds. Forms filled by patient are scanned in the chart    The pain meds are helping control the pain and improving Activities of Daily living and quality of life and quality of sleep.    opioids treatment agreement January 2025    Pill count: reported on schedule. last fill was on 6/4  for 100 tabs,  the meds pill count today is correct  Oarrs pulled and reviewed, no concerns  last urine toxicology testing was compliant this was done on : Jan 2025  Xray updated spine and joints  ORT (opioid risk tool) score is 0  Pain pathology and pain generators spine and  "joints  Modalities tried injection, surgery, physical therapy, TENS unit, nonsteroidal anti-inflammatory medication       Review of Systems :  Denied any fever or chills. No weight loss and no night sweats. No cough or sputum production. No diarrhea   The constipation has been responding to fibers and over the counter medications.     No bladder and bowel incontinence and no other changes in bladder and bowel. No skin changes.  Reports tiredness and fatigability only if the pain is not controlled.     I further Asked about symptoms or changes affecting the vision, hearing, breathing, digestive system, urinary symptoms, skin, other musculoskeletal condition, neurological conditions these are negative except as detailed in the history and physical examination above    Denied opioids diversion and abuse and denies alcoholism. Denies overuse of the pain medications.  No reported euphoria sensation or getting a \"high\" on the pain medications.    The control of the pain with the pain medications is helping the control of the symptoms and allowing the function and activities of daily living, enjoyment of life, improving the quality of life and sleep with less interruption by the pain. The goal is symptomatic control of the nonmalignant chronic pain and not to repair the permanent damage in the tissues inducing the chronic pain conditions. We are aiming to shift the focus from the nonmalignant chronic pain to other aspects of life by symptomatically treating this chronic pain. If this pain is not treated it will lead to major morbidity and it is also associated with increased risks of mortality. The patient understands those very clearly and also understand high risks of morbidity and mortality if not strictly adherent to the treatment recommendations and reporting any associated side effects. Also patient understand the full responsibility associated with these medications to avoid abuse or overuse or any use of these " medications for anything besides treating the patient's own chronic pain and nothing else under any circumstances.        Physical examination  Awake, alert and oriented for time place and persons   Pupils are equal and reactive to light and accommodation    This is a secure A/V visit    Diagnosis  Problem List Items Addressed This Visit       Lumbar degenerative disc disease    Relevant Medications    gabapentin (Neurontin) 300 mg capsule    naloxegol oxalate (Movantik) 25 mg    oxyCODONE-acetaminophen (Percocet) 5-325 mg tablet (Start on 7/2/2025)    oxyCODONE-acetaminophen (Percocet) 5-325 mg tablet (Start on 7/30/2025)    Lumbar radiculitis    Relevant Medications    gabapentin (Neurontin) 300 mg capsule    naloxegol oxalate (Movantik) 25 mg    oxyCODONE-acetaminophen (Percocet) 5-325 mg tablet (Start on 7/2/2025)    oxyCODONE-acetaminophen (Percocet) 5-325 mg tablet (Start on 7/30/2025)    Rheumatoid arthritis of 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  Went over the types of pain with neuropathic and nociceptive and different pathologies and therapeutic modalities. Discussed the mechanism of action of interventions from acupuncture, physical therapy , regular exercises, injections, botox, spinal cord stimulation, and role of surgery     Went over pathology of the intervertebral disc displacement and the anatomical relation to the Nerve roots and relation to the radicular symptoms. Went over  treatment modalities with conservative treatment including acupuncture   and epidural steroid injection with fluoroscopy guidance and last resort of surgery    Based on the above findings and the clinical response to the opioids medications and improvement of the activities of daily living, sleep, and work performance. We made this complex decision to continue the opioids therapy in light of the evidence of the patient's responsibility in using the pain medications as prescribed for the nonmalignant chronic pain condition. We discussed about the use of the pain medications to treat the symptoms of chronic nonmalignant pain and we are not trying the repair the permanent damage in the tissues, rather we are trying to control the symptoms induced by the permanent damage to the tissues inducing the chronic pain condition and resulting disability. I explained the difference and discussed it with the patient and stressed the importance of knowing the difference especially because of the potential side effects and the potential addicting effect and habit forming nature of the dangerous drugs we are using to treat the symptoms of the chronic pain.      We discussed that we are prescribing the medications on good qi and legitimate medical reason within the scope of professional medical practice.     We reviewed the side effects and precautions of opioids prescriptions as discussed in the opioids treatment agreement.    realizes the interaction between the therapeutic classes including the respiratory depression and potential death     Random drug testing   we will submit         Discussed about NSAIDS and I explained about the opioids sparing effect to allow keeping the opioids dose at minimal effective dose.   I went over the potential side effects of the NSAIDS on the gastrointestinal, renal and cardiovascular systems.      I detailed the side effects from the acetaminophen in the medication and made aware of those. I  also explained about the cumulative effects on the organs and mainly the liver.     Given the opioids therapy , we discussed about the risk for accidental over dose on the pain medications, either for patient or other household. I went over the mechanism of action and mode of use of the Naloxone according to the  recommendations. I will provide a prescription for a kit.     Focus of Today's Visit :  Continue the pain medication 100 tablets for the month continue with home excise program.  We will try to cut back further  Most importantly keep appointment with rheumatology      Follow-up 8 weeks or earlier if needed     The level of clinical decision making in this office visit,  is high, given the high risks of complications with the morbidity and mortality due to the fact that acute and chronic pain may pose a threat to life and bodily function, if under treated, poorly treated, or with failure to maintain adequate treatment and timely medical follow up. Additionally over treatment has its own set of complications including overdosing on the pain medications and also the habit forming potentials with the use of the medications used to treat chronic painful conditions including therapeutic classes classified as dangerous medications. Given the serious and fluctuating nature of pain level and instensity with extensive consideration for whenever pain changes, there is always the risk of prolonged functional impairment requiring close patient monitoring with regular assessments and reassessments and high level medical decision making at every office visit. The amount and complexity of data reviewed is high given the patient clinical presentation, labs,  data, radiology reports, and other tests as discussed during office visits. Pertinent data whether positive or negative were taken in consideration in the process of making this high level medical decision.

## 2025-08-21 ENCOUNTER — APPOINTMENT (OUTPATIENT)
Dept: PAIN MEDICINE | Facility: CLINIC | Age: 75
End: 2025-08-21
Payer: COMMERCIAL

## 2025-08-21 DIAGNOSIS — M51.369 LUMBAR DEGENERATIVE DISC DISEASE: ICD-10-CM

## 2025-08-21 DIAGNOSIS — Z79.891 LONG TERM CURRENT USE OF OPIATE ANALGESIC: ICD-10-CM

## 2025-08-21 DIAGNOSIS — M54.16 LUMBAR RADICULITIS: ICD-10-CM

## 2025-08-21 DIAGNOSIS — M06.09 RHEUMATOID ARTHRITIS OF MULTIPLE SITES WITH NEGATIVE RHEUMATOID FACTOR (MULTI): ICD-10-CM

## 2025-08-21 PROCEDURE — G2211 COMPLEX E/M VISIT ADD ON: HCPCS | Performed by: PHYSICAL MEDICINE & REHABILITATION

## 2025-08-21 PROCEDURE — 99214 OFFICE O/P EST MOD 30 MIN: CPT | Performed by: PHYSICAL MEDICINE & REHABILITATION

## 2025-08-21 RX ORDER — OXYCODONE AND ACETAMINOPHEN 5; 325 MG/1; MG/1
1 TABLET ORAL EVERY 6 HOURS PRN
Qty: 100 TABLET | Refills: 0 | Status: SHIPPED | OUTPATIENT
Start: 2025-08-30 | End: 2025-09-27

## 2025-08-21 RX ORDER — GABAPENTIN 300 MG/1
300 CAPSULE ORAL 3 TIMES DAILY
Qty: 90 CAPSULE | Refills: 1 | Status: SHIPPED | OUTPATIENT
Start: 2025-08-21 | End: 2025-09-20

## 2025-08-21 RX ORDER — DICLOFENAC SODIUM 10 MG/G
4 GEL TOPICAL 4 TIMES DAILY
Qty: 120 G | Refills: 2 | Status: SHIPPED | OUTPATIENT
Start: 2025-08-21

## 2025-08-21 RX ORDER — OXYCODONE AND ACETAMINOPHEN 5; 325 MG/1; MG/1
1 TABLET ORAL EVERY 6 HOURS PRN
Qty: 100 TABLET | Refills: 0 | Status: SHIPPED | OUTPATIENT
Start: 2025-09-27 | End: 2025-10-25

## 2025-08-23 LAB
1OH-MIDAZOLAM UR-MCNC: ABNORMAL NG/ML
7AMINOCLONAZEPAM UR-MCNC: ABNORMAL NG/ML
A-OH ALPRAZ UR-MCNC: ABNORMAL NG/ML
A-OH-TRIAZOLAM UR-MCNC: ABNORMAL NG/ML
AMPHETAMINES UR QL: NEGATIVE NG/ML
BARBITURATES UR QL: NEGATIVE NG/ML
BZE UR QL: NEGATIVE NG/ML
CODEINE UR-MCNC: NEGATIVE NG/ML
CREAT UR-MCNC: 90.2 MG/DL
DRUG SCREEN COMMENT UR-IMP: ABNORMAL
EDDP UR-MCNC: NEGATIVE NG/ML
FENTANYL UR-MCNC: ABNORMAL NG/ML
HYDROCODONE UR-MCNC: NEGATIVE NG/ML
HYDROMORPHONE UR-MCNC: NEGATIVE NG/ML
LORAZEPAM UR-MCNC: ABNORMAL NG/ML
METHADONE UR-MCNC: NEGATIVE NG/ML
MORPHINE UR-MCNC: NEGATIVE NG/ML
NORDIAZEPAM UR-MCNC: ABNORMAL NG/ML
NORFENTANYL UR-MCNC: ABNORMAL NG/ML
NORHYDROCODONE UR CFM-MCNC: NEGATIVE NG/ML
NOROXYCODONE UR CFM-MCNC: 5490 NG/ML
NORTRAMADOL UR-MCNC: NEGATIVE NG/ML
OH-ETHYLFLURAZ UR-MCNC: ABNORMAL NG/ML
OXAZEPAM UR-MCNC: ABNORMAL UG/ML
OXIDANTS UR QL: NEGATIVE MCG/ML
OXYCODONE UR CFM-MCNC: 884 NG/ML
OXYMORPHONE UR CFM-MCNC: 419 NG/ML
PCP UR QL: NEGATIVE NG/ML
PH UR: 6.7 [PH] (ref 4.5–9)
QUEST 6 ACETYLMORPHINE: ABNORMAL
QUEST NOTES AND COMMENTS: ABNORMAL
QUEST PATIENT HISTORICAL REPORT: ABNORMAL
QUEST ZOLPIDEM: ABNORMAL
TEMAZEPAM UR-MCNC: ABNORMAL NG/ML
THC UR QL: NEGATIVE NG/ML
TRAMADOL UR-MCNC: NEGATIVE NG/ML
ZOLPIDEM PHENYL-4-CARB UR CFM-MCNC: ABNORMAL NG/ML

## 2025-08-27 LAB
1OH-MIDAZOLAM UR-MCNC: NEGATIVE NG/ML
7AMINOCLONAZEPAM UR-MCNC: NEGATIVE NG/ML
A-OH ALPRAZ UR-MCNC: NEGATIVE NG/ML
A-OH-TRIAZOLAM UR-MCNC: NEGATIVE NG/ML
AMPHETAMINES UR QL: NEGATIVE NG/ML
BARBITURATES UR QL: NEGATIVE NG/ML
BZE UR QL: NEGATIVE NG/ML
CODEINE UR-MCNC: NEGATIVE NG/ML
CREAT UR-MCNC: 90.2 MG/DL
DRUG SCREEN COMMENT UR-IMP: ABNORMAL
EDDP UR-MCNC: NEGATIVE NG/ML
FENTANYL UR-MCNC: NEGATIVE NG/ML
HYDROCODONE UR-MCNC: NEGATIVE NG/ML
HYDROMORPHONE UR-MCNC: NEGATIVE NG/ML
LORAZEPAM UR-MCNC: NEGATIVE NG/ML
METHADONE UR-MCNC: NEGATIVE NG/ML
MORPHINE UR-MCNC: NEGATIVE NG/ML
NORDIAZEPAM UR-MCNC: NEGATIVE NG/ML
NORFENTANYL UR-MCNC: NEGATIVE NG/ML
NORHYDROCODONE UR CFM-MCNC: NEGATIVE NG/ML
NOROXYCODONE UR CFM-MCNC: 5490 NG/ML
NORTRAMADOL UR-MCNC: NEGATIVE NG/ML
OH-ETHYLFLURAZ UR-MCNC: NEGATIVE NG/ML
OXAZEPAM UR-MCNC: NEGATIVE NG/ML
OXIDANTS UR QL: NEGATIVE MCG/ML
OXYCODONE UR CFM-MCNC: 884 NG/ML
OXYMORPHONE UR CFM-MCNC: 419 NG/ML
PCP UR QL: NEGATIVE NG/ML
PH UR: 6.7 [PH] (ref 4.5–9)
QUEST 6 ACETYLMORPHINE: NEGATIVE NG/ML
QUEST NOTES AND COMMENTS: ABNORMAL
QUEST ZOLPIDEM: NEGATIVE NG/ML
TEMAZEPAM UR-MCNC: NEGATIVE NG/ML
THC UR QL: NEGATIVE NG/ML
TRAMADOL UR-MCNC: NEGATIVE NG/ML
ZOLPIDEM PHENYL-4-CARB UR CFM-MCNC: NEGATIVE NG/ML

## 2025-10-16 ENCOUNTER — APPOINTMENT (OUTPATIENT)
Dept: PAIN MEDICINE | Facility: CLINIC | Age: 75
End: 2025-10-16
Payer: COMMERCIAL